# Patient Record
Sex: FEMALE | Race: ASIAN | Employment: STUDENT | URBAN - METROPOLITAN AREA
[De-identification: names, ages, dates, MRNs, and addresses within clinical notes are randomized per-mention and may not be internally consistent; named-entity substitution may affect disease eponyms.]

---

## 2023-10-24 ENCOUNTER — OFFICE VISIT (OUTPATIENT)
Dept: OBGYN CLINIC | Facility: CLINIC | Age: 23
End: 2023-10-24

## 2023-10-24 VITALS
HEIGHT: 65 IN | WEIGHT: 209.31 LBS | BODY MASS INDEX: 34.87 KG/M2 | SYSTOLIC BLOOD PRESSURE: 116 MMHG | DIASTOLIC BLOOD PRESSURE: 76 MMHG

## 2023-10-24 DIAGNOSIS — N92.6 IRREGULAR PERIODS: Primary | ICD-10-CM

## 2023-10-24 PROCEDURE — 3074F SYST BP LT 130 MM HG: CPT | Performed by: OBSTETRICS & GYNECOLOGY

## 2023-10-24 PROCEDURE — 3008F BODY MASS INDEX DOCD: CPT | Performed by: OBSTETRICS & GYNECOLOGY

## 2023-10-24 PROCEDURE — 3078F DIAST BP <80 MM HG: CPT | Performed by: OBSTETRICS & GYNECOLOGY

## 2023-10-24 PROCEDURE — 99213 OFFICE O/P EST LOW 20 MIN: CPT | Performed by: OBSTETRICS & GYNECOLOGY

## 2023-10-24 RX ORDER — ESCITALOPRAM OXALATE 10 MG/1
TABLET ORAL
COMMUNITY
Start: 2023-06-22

## 2023-10-24 RX ORDER — LEVOCETIRIZINE DIHYDROCHLORIDE 5 MG/1
5 TABLET, FILM COATED ORAL EVERY EVENING
COMMUNITY

## 2023-10-24 RX ORDER — DESOGESTREL AND ETHINYL ESTRADIOL 21-5 (28)
KIT ORAL
COMMUNITY
End: 2023-10-24

## 2023-10-24 RX ORDER — METFORMIN HYDROCHLORIDE 500 MG/1
500 TABLET, EXTENDED RELEASE ORAL
COMMUNITY
Start: 2023-04-21 | End: 2024-07-28

## 2023-10-24 RX ORDER — ORAL SEMAGLUTIDE 3 MG/1
1 TABLET ORAL DAILY
COMMUNITY
Start: 2023-04-21

## 2023-11-21 ENCOUNTER — OFFICE VISIT (OUTPATIENT)
Facility: CLINIC | Age: 23
End: 2023-11-21
Payer: COMMERCIAL

## 2023-11-21 VITALS
HEART RATE: 103 BPM | BODY MASS INDEX: 35.16 KG/M2 | HEIGHT: 65 IN | DIASTOLIC BLOOD PRESSURE: 70 MMHG | TEMPERATURE: 99 F | WEIGHT: 211 LBS | SYSTOLIC BLOOD PRESSURE: 100 MMHG | OXYGEN SATURATION: 98 %

## 2023-11-21 DIAGNOSIS — F41.9 ANXIETY: ICD-10-CM

## 2023-11-21 DIAGNOSIS — E55.9 VITAMIN D DEFICIENCY: ICD-10-CM

## 2023-11-21 DIAGNOSIS — E61.1 IRON DEFICIENCY: ICD-10-CM

## 2023-11-21 DIAGNOSIS — J45.990 MILD EXERCISE-INDUCED ASTHMA: Primary | ICD-10-CM

## 2023-11-21 DIAGNOSIS — E78.00 PURE HYPERCHOLESTEROLEMIA: ICD-10-CM

## 2023-11-21 DIAGNOSIS — Z91.018 ALLERGY TO FOOD: ICD-10-CM

## 2023-11-21 PROBLEM — T78.1XXA ALLERGIC REACTION TO FOOD: Status: ACTIVE | Noted: 2023-01-17

## 2023-11-21 PROBLEM — K21.9 GASTROESOPHAGEAL REFLUX DISEASE WITHOUT ESOPHAGITIS: Status: ACTIVE | Noted: 2022-09-09

## 2023-11-21 PROBLEM — L50.9 URTICARIA: Status: ACTIVE | Noted: 2022-08-30

## 2023-11-21 PROBLEM — L70.9 ACNE: Status: ACTIVE | Noted: 2022-06-28

## 2023-11-21 PROBLEM — E78.5 HYPERLIPIDEMIA: Status: ACTIVE | Noted: 2022-06-28

## 2023-11-21 PROBLEM — J30.2 SEASONAL ALLERGIC RHINITIS: Status: ACTIVE | Noted: 2023-01-17

## 2023-11-21 PROBLEM — E28.2 PCOS (POLYCYSTIC OVARIAN SYNDROME): Status: ACTIVE | Noted: 2022-06-28

## 2023-11-21 PROBLEM — K58.0 IRRITABLE BOWEL SYNDROME WITH DIARRHEA: Status: ACTIVE | Noted: 2022-09-30

## 2023-11-21 PROCEDURE — 3078F DIAST BP <80 MM HG: CPT | Performed by: FAMILY MEDICINE

## 2023-11-21 PROCEDURE — 3008F BODY MASS INDEX DOCD: CPT | Performed by: FAMILY MEDICINE

## 2023-11-21 PROCEDURE — 99203 OFFICE O/P NEW LOW 30 MIN: CPT | Performed by: FAMILY MEDICINE

## 2023-11-21 PROCEDURE — 3074F SYST BP LT 130 MM HG: CPT | Performed by: FAMILY MEDICINE

## 2023-11-21 RX ORDER — EPINEPHRINE 0.3 MG/.3ML
0.3 INJECTION SUBCUTANEOUS AS NEEDED
COMMUNITY
Start: 2023-01-17

## 2023-11-21 RX ORDER — FERROUS SULFATE 325(65) MG
325 TABLET ORAL DAILY
COMMUNITY

## 2023-11-21 RX ORDER — ESCITALOPRAM OXALATE 10 MG/1
10 TABLET ORAL DAILY
Qty: 90 TABLET | Refills: 3 | Status: SHIPPED | OUTPATIENT
Start: 2023-11-21

## 2023-11-21 RX ORDER — ALBUTEROL SULFATE 90 UG/1
2 AEROSOL, METERED RESPIRATORY (INHALATION) AS NEEDED
COMMUNITY

## 2023-11-21 RX ORDER — ESCITALOPRAM OXALATE 5 MG/1
5 TABLET ORAL DAILY
COMMUNITY
End: 2023-11-21

## 2023-11-21 RX ORDER — AZELASTINE 1 MG/ML
2 SPRAY, METERED NASAL AS NEEDED
COMMUNITY
Start: 2023-07-18 | End: 2024-07-17

## 2023-11-21 RX ORDER — BUDESONIDE AND FORMOTEROL FUMARATE DIHYDRATE 80; 4.5 UG/1; UG/1
2 AEROSOL RESPIRATORY (INHALATION) AS NEEDED
COMMUNITY
Start: 2023-07-18

## 2023-11-21 RX ORDER — ASCORBIC ACID 500 MG
1 TABLET ORAL EVERY OTHER DAY
COMMUNITY

## 2023-11-21 RX ORDER — ESCITALOPRAM OXALATE 5 MG/1
5 TABLET ORAL DAILY
Qty: 90 TABLET | Refills: 3 | Status: SHIPPED | OUTPATIENT
Start: 2023-11-21

## 2023-11-21 RX ORDER — ONDANSETRON 4 MG/1
4 TABLET, FILM COATED ORAL EVERY 8 HOURS PRN
COMMUNITY
Start: 2023-06-30

## 2023-11-27 NOTE — H&P
[address] [facility] - Gastroenterology                                                                                                               Reason for consult: GERD    Requesting physician or provider: No primary care provider on file. Chief Complaint   Patient presents with    Consult     Stomach issues - nausea after eating, heartburn/reflux        HPI:   Catrina Eller is a 21year old year-old female with medical history including asthma, anxiety, obesity, PCOS, CHUN. On metformin & semaglutide for PCOS & weight loss. #GERD  -symptoms are nausea, epigastric pain, heartburn, acid regurgitation x months. Reports nausea is worse with semaglutide.  -on daily omeprazole x 6 weeks with some improvement  -tested negative for H. Pylori over the summer  -on iron for CHUN - from irregular menses, now on [facility]  -CBC & CMP unremarkable July 2023    Patient denies symptoms of  vomiting, dysphagia, odynophagia, globus sensation, hematemesis, change in bowel habits, constipation, diarrhea, hematochezia, or melena. she denies recent change in appetite, fever or unintentional weight loss.       Last colonoscopy: none   Last EGD:none     NSAIDS: 400mg advil for headaches 3x weekly   Tobacco: none   Alcohol:none   Marijuana:none   Illicit drugs: none     FH GI malignancy: maternal grandfather - pancreatic CA    No history of adverse reaction to sedation  No FABRIZIO  No anticoagulants  No pacemaker/defibrillator  No pain medications and/or sleep aides    Wt Readings from Last 6 Encounters:   12/01/23 211 lb (95.7 kg)   11/21/23 211 lb (95.7 kg)   10/24/23 209 lb 4.8 oz (94.9 kg)        History, Medications, Allergies, ROS:      Past Medical History:   Diagnosis Date    Anxiety     Asthma     Environmental allergies     Obesity     PCOS (polycystic ovarian syndrome)       Past Surgical History:   Procedure Laterality Date    PATIENT DENIES ANY SURGICAL HISTORY        Family Hx:   Family History   Problem Relation Age of Onset    Thyroid disease Mother     Hypertension Mother     No Known Problems Father     No Known Problems Brother     No Known Problems Brother     Pancreatic Cancer Maternal Grandfather     Cancer Maternal Grandfather         Pancreatic cancer    Diabetes Paternal Grandfather     Substance Abuse Paternal Grandfather     Breast Cancer Neg     Ovarian Cancer Neg     Colon Cancer Neg     Uterine Cancer Neg       Social History:   Social History     Socioeconomic History    Marital status: Single   Occupational History    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[address] [employer]     Comment: plans to apply to med school   Tobacco Use    Smoking status: Never    Smokeless tobacco: Never   Vaping Use    Vaping Use: Never used   Substance and Sexual Activity    Alcohol use: Never    Drug use: Never    Sexual activity: Yes     Partners: Female     Birth control/protection: OCP   Other Topics Concern    Blood Transfusions No   Social History Narrative    Lives with partner Tories    No abuse        Medications (Active prior to today's visit):  Current Outpatient Medications   Medication Sig Dispense Refill    ondansetron 4 MG Oral Tablet Dispersible Take 1 tablet (4 mg total) by mouth every 8 (eight) hours as needed for Nausea. 30 tablet 0    albuterol 108 (90 Base) MCG/ACT Inhalation Aero Soln Inhale 2 puffs into the lungs as needed for Shortness of Breath. cholecalciferol 25 MCG (1000 UT) Oral Cap Take 1 capsule (1,000 Units total) by mouth daily. ascorbic acid 500 MG Oral Tab Take 1 tablet (500 mg total) by mouth every other day. azelastine 0.1 % Nasal Solution 2 sprays by Nasal route as needed for Rhinitis. Budesonide-Formoterol Fumarate 80-4.5 MCG/ACT Inhalation Aerosol Inhale 2 puffs into the lungs as needed. Ferrous Sulfate 325 (65 Fe) MG Oral Tab Take 1 tablet (325 mg total) by mouth daily. ondansetron (ZOFRAN) 4 mg tablet Take 1 tablet (4 mg total) by mouth every 8 (eight) hours as needed. escitalopram (LEXAPRO) 10 MG Oral Tab Take 1 tablet (10 mg total) by mouth daily. 90 tablet 3    escitalopram 5 MG Oral Tab Take 1 tablet (5 mg total) by mouth daily. 90 tablet 3    metFORMIN  MG Oral Tablet 24 Hr Take 1 tablet (500 mg total) by mouth. Semaglutide (RYBELSUS) 3 MG Oral Tab Take 1 tablet by mouth daily. levocetirizine 5 MG Oral Tab Take 1 tablet (5 mg total) by mouth every evening. norgestrel-ethinyl estradiol 0.3-30 MG-MCG Oral Tab Take 1 tablet by mouth daily. Do not take placebos 84 tablet 4    EPINEPHrine 0.3 MG/0.3ML Injection Solution Auto-injector Inject 0.3 mL (1 each total) into the muscle as needed. (Patient not taking: Reported on 12/1/2023)         Allergies: Allergies   Allergen Reactions    Shellfish Allergy HIVES, ITCHING and PAIN    Shrimp HIVES       ROS:   CONSTITUTIONAL: negative for fevers, chills, sweats  EYES Negative for scleral icterus or redness, and diplopia  HEENT: Negative for hoarseness  RESPIRATORY: Negative for cough and severe shortness of breath  CARDIOVASCULAR: Negative for crushing sub-sternal chest pain  GASTROINTESTINAL: See HPI  GENITOURINARY: Negative for dysuria  MUSCULOSKELETAL: Negative for arthralgias and myalgias  SKIN: Negative for jaundice, rash or pruritus  NEUROLOGICAL: Negative for dizziness and headaches  BEHAVIOR/PSYCH: Negative for psychotic behavior    PHYSICAL EXAM:   Blood pressure 126/83, pulse 74, height 5' 5\" (1.651 m), weight 211 lb (95.7 kg), last menstrual period 09/25/2023. GEN: Alert, no acute distress, well-nourished   HEENT: anicteric sclera, neck supple, trachea midline, MMM, no palpable or tender neck or supraclavicular lymph nodes  CV: RRR, the extremities are warm and well perfused   LUNGS: No increased work of breathing, CTAB  ABDOMEN: Soft, symmetrical, non-tender without distention or guarding.  No scars or lesions. Aorta is without bruit or visible pulsation. Umbilicus is midline without herniation. Normoactive bowel sounds are present, No masses, hepatomegaly or splenomegaly noted. MSK: No erythema, no warmth, no swelling of joints  SKIN: No jaundice, no erythema, no rashes, no lesions  HEMATOLOGIC: No bleeding, no bruising  NEURO: Alert and interactive, BRONSON  PSYCH: appropriate mood & affect    Labs/Imaging/Procedures:     Patient's pertinent labs and imaging were reviewed and discussed with patient today. .  ASSESSMENT/PLAN:   Marisol Blanco is a 21year old year-old female with medical history including asthma, anxiety, obesity, PCOS, CHUN. #GERD  -symptoms are nausea, epigastric pain, heartburn, acid regurgitation x months. Reports nausea is worse with semaglutide.  -on daily omeprazole x 6 weeks with some improvement  -tested negative for H. Pylori over the summer  -on iron for CHUN - from irregular menses, now on [facility]  -CBC & CMP unremarkable July 2023  -Differentials include but not limited to:PUD, GERD, gastritis, functional dyspepsia, gastroparesis, pancreatitis      Recommendations:  -Schedule upper endoscopy (EGD) with Dr. Codey Nelson, Dr. Yari Escobedo or Dr. Jackie Green  [Diagnosis: GERD & epigastric pain]    *HOLD metformin day before & day of procedure  *HOLD semaglutide for 1 week prior to procedure    -advised GERD lifestyle changes and to continue omeprazole 40mg daily. Can titrate off if symptoms improving or keep on daily if needed. Advised to try and decrease advil use and try tylenol instead. ENDOSCOPIC RISK BENEFIT DISCUSSION: I described the procedure in great detail with the patient. I discussed the risks and benefits, including but not limited to: bleeding, perforation, infection, anesthesia complications, and even death. Patient will be NPO after midnight and will have a person physically present at time of pick-up to drive patient home.  Patient verbalized understanding and agrees to proceed with procedure as planned. Orders This Visit:  No orders of the defined types were placed in this encounter. Meds This Visit:  Requested Prescriptions     Signed Prescriptions Disp Refills    ondansetron 4 MG Oral Tablet Dispersible 30 tablet 0     Sig: Take 1 tablet (4 mg total) by mouth every 8 (eight) hours as needed for Nausea. Imaging & Referrals:  None      Lizandro Alarcon [facility]  11/27/2023        This note was partially prepared using [employer] [facility] [employer] voice recognition dictation software. As a result, errors may occur. When identified, these errors have been corrected.  While every attempt is made to correct errors during dictation, discrepancies may still exist. 124

## 2023-12-01 ENCOUNTER — TELEPHONE (OUTPATIENT)
Facility: CLINIC | Age: 23
End: 2023-12-01

## 2023-12-01 ENCOUNTER — OFFICE VISIT (OUTPATIENT)
Facility: CLINIC | Age: 23
End: 2023-12-01
Payer: COMMERCIAL

## 2023-12-01 VITALS
DIASTOLIC BLOOD PRESSURE: 83 MMHG | BODY MASS INDEX: 35.16 KG/M2 | HEART RATE: 74 BPM | WEIGHT: 211 LBS | SYSTOLIC BLOOD PRESSURE: 126 MMHG | HEIGHT: 65 IN

## 2023-12-01 DIAGNOSIS — R10.13 EPIGASTRIC PAIN: Primary | ICD-10-CM

## 2023-12-01 DIAGNOSIS — R10.13 EPIGASTRIC PAIN: ICD-10-CM

## 2023-12-01 DIAGNOSIS — K21.9 GASTROESOPHAGEAL REFLUX DISEASE, UNSPECIFIED WHETHER ESOPHAGITIS PRESENT: ICD-10-CM

## 2023-12-01 DIAGNOSIS — R11.0 NAUSEA: ICD-10-CM

## 2023-12-01 DIAGNOSIS — K21.9 GASTROESOPHAGEAL REFLUX DISEASE, UNSPECIFIED WHETHER ESOPHAGITIS PRESENT: Primary | ICD-10-CM

## 2023-12-01 PROCEDURE — 99204 OFFICE O/P NEW MOD 45 MIN: CPT

## 2023-12-01 PROCEDURE — 3079F DIAST BP 80-89 MM HG: CPT

## 2023-12-01 PROCEDURE — 3008F BODY MASS INDEX DOCD: CPT

## 2023-12-01 PROCEDURE — 3074F SYST BP LT 130 MM HG: CPT

## 2023-12-01 RX ORDER — ONDANSETRON 4 MG/1
4 TABLET, ORALLY DISINTEGRATING ORAL EVERY 8 HOURS PRN
Qty: 30 TABLET | Refills: 0 | Status: SHIPPED | OUTPATIENT
Start: 2023-12-01

## 2023-12-01 NOTE — PATIENT INSTRUCTIONS
Antacids  -Tums, Rolaids, Camille-seltzer heartburn (calcium carbonate)  -pepto-bismol (Bismuth subsalicylate, aluminium magnesium)  -mylanta, maalox, gaviscon, gelusil (Aluminum Hydroxide, Magnesium Hydroxide)  -milk of magnesia (magnesium hydroxide)    H2 receptor antagonists  -famotidine (Pepcid) 20-40mg daily (or 20mg BID)    Proton-pump inhibitors (PPIs)  -omeprazole (prilosec) 20mg-40mg daily  -pantoprazole (protonix) 40mg daily  -lansoprazole (prevacid) 15-30mg daily  -Esomeprazole (Nexium) 20-40mg daily  -Rabeprazole (AcipHex) 20mg daily      1. Schedule upper endoscopy (EGD) with Dr. Jen Diamond, Dr. Dai Rose or Dr. Karen Brock: GERD & epigastric pain]    *HOLD metformin day before & day of procedure  *HOLD semaglutide for 1 week prior to procedure    2. If you start any NEW medication after your visit today, please notify us. Certain medications will need to be held before the procedure, or the procedure cannot be performed safely. 3. DO NOT TAKE: Iron (ferrous sulfate/ ferrous gluconate) pills, herbal supplements, multivitamins, or diet medications (i.e. Phentermine/Vyvanse) for 7 days before exam.  DO NOT TAKE: Any form of alcohol, recreational drugs and any forms of Erectile Dysfunction medications 24 hours prior to procedure. 4. The day BEFORE your procedure, NOTHING TO EAT OR DRINK AFTER MIDNIGHT! If your procedure is scheduled in the afternoon, you may have clear liquids only (see below) up to 3 hours before the time of your procedure. If you fail to keep your stomach empty for 3 hours prior to procedure time, your procedure may be CANCELLED.  Instructions can also be found at: www.[facility].org/giprep

## 2023-12-01 NOTE — TELEPHONE ENCOUNTER
Scheduled for:  EGD 01980  Provider Name:  Dr. Julio Cesar Prabhakar  Date:  2/1/2024  Location:  [facility]  Sedation:  MAC  Time:  1:30 pm, arrival 12:30 pm  Prep:  NPO after midnight  Meds/Allergies Reconciled?:  Mackenzie Phelan reviewed  Diagnosis with codes:  GERD K21.9; Epigastric pain R10.13  Was patient informed to call insurance with codes (Y/N):  Yes  Referral sent?:  Referral was sent at the time of electronic surgical scheduling. [facility]  notified?:  I sent an electronic request to Endo Scheduling and received a confirmation today. Medication Orders:  *HOLD metformin day before & day of procedure  *HOLD semaglutide for 1 week prior to procedure  Pt is aware to NOT take iron pills, herbal meds and diet supplements for 7 days before exam. Also to NOT take any form of alcohol, recreational drugs and any forms of ED meds 24 hours before exam.   Misc Orders:  N/A     Further instructions given by staff:  Prep instructions were given to pt in the office, pt verbalized understanding.

## 2023-12-08 ENCOUNTER — PATIENT MESSAGE (OUTPATIENT)
Facility: CLINIC | Age: 23
End: 2023-12-08

## 2023-12-09 RX ORDER — ORAL SEMAGLUTIDE 14 MG/1
1 TABLET ORAL DAILY
Qty: 90 TABLET | Refills: 3 | Status: SHIPPED | OUTPATIENT
Start: 2023-12-09

## 2023-12-09 RX ORDER — ORAL SEMAGLUTIDE 14 MG/1
TABLET ORAL
COMMUNITY
Start: 2023-06-28 | End: 2023-12-09

## 2023-12-26 ENCOUNTER — PATIENT MESSAGE (OUTPATIENT)
Dept: OBGYN CLINIC | Facility: CLINIC | Age: 23
End: 2023-12-26

## 2023-12-27 DIAGNOSIS — N92.6 IRREGULAR PERIODS: ICD-10-CM

## 2023-12-27 NOTE — TELEPHONE ENCOUNTER
From: Ashanti Judge  To: Monica Mackenzie  Sent: 12/26/2023 9:15 PM CST  Subject: Birth control refill - [place]    Hi,    I have a family emergency and I have to head out of town tomorrow back to [place] where my family lives. I will run out of birth control pills on Saturday.  Can I get my script refilled at a [facility] in [place]?

## 2024-01-17 ENCOUNTER — TELEPHONE (OUTPATIENT)
Facility: CLINIC | Age: 24
End: 2024-01-17

## 2024-01-17 DIAGNOSIS — R10.13 EPIGASTRIC PAIN: ICD-10-CM

## 2024-01-17 DIAGNOSIS — K21.9 GASTROESOPHAGEAL REFLUX DISEASE, UNSPECIFIED WHETHER ESOPHAGITIS PRESENT: Primary | ICD-10-CM

## 2024-01-17 NOTE — TELEPHONE ENCOUNTER
CANCELLED for:  EGD 58215  Provider Name:  Dr. Diaz  Date:  2/1/2024  Location:  [facility]  Sedation:  MAC  Time:  1:30 pm,            Cancelled in epic and endo

## 2024-02-14 ENCOUNTER — OFFICE VISIT (OUTPATIENT)
Dept: OBGYN CLINIC | Facility: CLINIC | Age: 24
End: 2024-02-14
Payer: COMMERCIAL

## 2024-02-14 VITALS
BODY MASS INDEX: 36.49 KG/M2 | SYSTOLIC BLOOD PRESSURE: 110 MMHG | HEIGHT: 65 IN | DIASTOLIC BLOOD PRESSURE: 72 MMHG | WEIGHT: 219 LBS

## 2024-02-14 DIAGNOSIS — N92.1 METRORRHAGIA: Primary | ICD-10-CM

## 2024-02-14 PROCEDURE — 81514 NFCT DS BV&VAGINITIS DNA ALG: CPT | Performed by: OBSTETRICS & GYNECOLOGY

## 2024-02-14 PROCEDURE — 3074F SYST BP LT 130 MM HG: CPT | Performed by: OBSTETRICS & GYNECOLOGY

## 2024-02-14 PROCEDURE — 3008F BODY MASS INDEX DOCD: CPT | Performed by: OBSTETRICS & GYNECOLOGY

## 2024-02-14 PROCEDURE — 87591 N.GONORRHOEAE DNA AMP PROB: CPT | Performed by: OBSTETRICS & GYNECOLOGY

## 2024-02-14 PROCEDURE — 87491 CHLMYD TRACH DNA AMP PROBE: CPT | Performed by: OBSTETRICS & GYNECOLOGY

## 2024-02-14 PROCEDURE — 3078F DIAST BP <80 MM HG: CPT | Performed by: OBSTETRICS & GYNECOLOGY

## 2024-02-14 PROCEDURE — 99213 OFFICE O/P EST LOW 20 MIN: CPT | Performed by: OBSTETRICS & GYNECOLOGY

## 2024-02-14 RX ORDER — AZITHROMYCIN 500 MG/1
TABLET, FILM COATED ORAL
Qty: 4 TABLET | Refills: 0 | Status: SHIPPED | OUTPATIENT
Start: 2024-02-14 | End: 2024-02-18

## 2024-02-14 NOTE — PROGRESS NOTES
CC: Patient is here for vaginal bleeding after sex.     HPI: Patient is a 23 year old  for bleeding with sex or dildo x 1 M. No previous hx of similar c/o.     She switched to loovral 10/2023 with no futher BTB. Using pills continuously with no periods since.       Patient's last menstrual period was 2023.    OB History    Para Term  AB Living   0 0 0 0 0 0   SAB IAB Ectopic Multiple Live Births   0 0 0 0 0       GYN hx:       LPS:    Past Medical History:   Diagnosis Date    Anxiety     Asthma     Environmental allergies     Obesity     PCOS (polycystic ovarian syndrome)      Past Surgical History:   Procedure Laterality Date    PATIENT DENIES ANY SURGICAL HISTORY       Allergies   Allergen Reactions    Shellfish Allergy HIVES, ITCHING and PAIN    Shrimp HIVES     Family History   Problem Relation Age of Onset    Thyroid disease Mother     Hypertension Mother     No Known Problems Father     No Known Problems Brother     No Known Problems Brother     Pancreatic Cancer Maternal Grandfather     Cancer Maternal Grandfather         Pancreatic cancer    Diabetes Paternal Grandfather     Substance Abuse Paternal Grandfather     Breast Cancer Neg     Ovarian Cancer Neg     Colon Cancer Neg     Uterine Cancer Neg      Social History     Socioeconomic History    Marital status: Single     Spouse name: Not on file    Number of children: Not on file    Years of education: Not on file    Highest education level: Not on file   Occupational History    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     Comment: plans to apply to med school   Tobacco Use    Smoking status: Never    Smokeless tobacco: Never   Vaping Use    Vaping Use: Never used   Substance and Sexual Activity    Alcohol use: Never    Drug use: Never    Sexual activity: Yes     Partners: Female     Birth control/protection: OCP   Other Topics Concern     Service Not Asked    Blood Transfusions No    Caffeine Concern Not  Asked    Occupational Exposure Not Asked    Hobby Hazards Not Asked    Sleep Concern Not Asked    Stress Concern Not Asked    Weight Concern Not Asked    Special Diet Not Asked    Back Care Not Asked    Exercise Not Asked    Bike Helmet Not Asked    Seat Belt Not Asked    Self-Exams Not Asked   Social History Narrative    Lives with partner Tories    No abuse     Social Determinants of Health     Financial Resource Strain: Not on file   Food Insecurity: Not on file   Transportation Needs: Not on file   Physical Activity: Not on file   Stress: Not on file   Social Connections: Not on file   Housing Stability: Not on file       Medications reviewed. See active list.     /72   Ht 65\"   Wt 219 lb (99.3 kg)   LMP 09/25/2023   BMI 36.44 kg/m²       Exam:   GENERAL: well developed, well nourished, in no apparent distress  ABDOMEN: Soft, non distended; non tender, no masses.  Liver and spleen non-tender, no enlargement. No palpable hernias  GYNE/:  External Genitalia: Normal appearing, no lesions, normal hair distribution   Urethral meatus appear wnl, no abnormal discharge or lesions noted.   Bladder: well supported, urethra wnl, no palpable tenderness or masses, no discharge  Vagina: normal pink mucosa, no lesions, normal clear discharge.   Uterus: midline, mobile, non-tender, firm and smooth  Cervix: pink, no lesions grossly visible, no discharge, + friable cervix.   Adnexa: non tender, no palpable masses, normal size  Anus:  No lesions or visible hemorrhoids        A/P: Patient is 23 year old female     1. Metrorrhagia    May be due to cervicitis. 2 gm azithromax given.       Sierra Mackenzie MD

## 2024-02-15 ENCOUNTER — PATIENT MESSAGE (OUTPATIENT)
Dept: OBGYN CLINIC | Facility: CLINIC | Age: 24
End: 2024-02-15

## 2024-02-15 LAB
BV BACTERIA DNA VAG QL NAA+PROBE: NEGATIVE
C GLABRATA DNA VAG QL NAA+PROBE: NEGATIVE
C KRUSEI DNA VAG QL NAA+PROBE: NEGATIVE
C TRACH DNA SPEC QL NAA+PROBE: NEGATIVE
CANDIDA DNA VAG QL NAA+PROBE: NEGATIVE
N GONORRHOEA DNA SPEC QL NAA+PROBE: NEGATIVE
T VAGINALIS DNA VAG QL NAA+PROBE: NEGATIVE

## 2024-02-15 NOTE — TELEPHONE ENCOUNTER
From: Michael Hoffman  To: Sierra Mackenzie  Sent: 2/15/2024 4:18 PM CST  Subject: Antibiotic     Since my tests were negative, do I still need to take the antibiotic?

## 2024-02-19 ENCOUNTER — ULTRASOUND ENCOUNTER (OUTPATIENT)
Dept: OBGYN CLINIC | Facility: CLINIC | Age: 24
End: 2024-02-19
Payer: COMMERCIAL

## 2024-02-19 DIAGNOSIS — N92.1 METRORRHAGIA: ICD-10-CM

## 2024-02-29 ENCOUNTER — OFFICE VISIT (OUTPATIENT)
Dept: OBGYN CLINIC | Facility: CLINIC | Age: 24
End: 2024-02-29

## 2024-02-29 ENCOUNTER — PATIENT MESSAGE (OUTPATIENT)
Dept: OBGYN CLINIC | Facility: CLINIC | Age: 24
End: 2024-02-29

## 2024-02-29 VITALS
DIASTOLIC BLOOD PRESSURE: 83 MMHG | WEIGHT: 216 LBS | HEART RATE: 79 BPM | HEIGHT: 65 IN | TEMPERATURE: 99 F | BODY MASS INDEX: 35.99 KG/M2 | SYSTOLIC BLOOD PRESSURE: 117 MMHG

## 2024-02-29 DIAGNOSIS — N93.9 ABNORMAL UTERINE BLEEDING (AUB): ICD-10-CM

## 2024-02-29 DIAGNOSIS — N94.10 DYSPAREUNIA, FEMALE: Primary | ICD-10-CM

## 2024-02-29 PROCEDURE — 3074F SYST BP LT 130 MM HG: CPT | Performed by: ADVANCED PRACTICE MIDWIFE

## 2024-02-29 PROCEDURE — 3008F BODY MASS INDEX DOCD: CPT | Performed by: ADVANCED PRACTICE MIDWIFE

## 2024-02-29 PROCEDURE — 99212 OFFICE O/P EST SF 10 MIN: CPT | Performed by: ADVANCED PRACTICE MIDWIFE

## 2024-02-29 PROCEDURE — 3079F DIAST BP 80-89 MM HG: CPT | Performed by: ADVANCED PRACTICE MIDWIFE

## 2024-02-29 NOTE — PROGRESS NOTES
Chief Complaint   Patient presents with    Gyn Exam     Patient is experiencing vaginal bleeding with penetration and pain and this morning she noticed more blood was coming out,  taking bc so she's not really supposed to be getting a period       HPI:   Michael is 23 year old , here today with her female partner to follow up on abnormal vaginal bleeding. Reports for the past 3 weeks she has had pain with arousal and vaginal bleeding with penetration. Reports the bleeding is dark red, gushes out for a very short time and then completely stops with no further bleeding or spotting after. It seems to be worsening because last night at 4am she had a random gush of the dark red blood totally spontaneously. Last nights gush of blood was painless.   In the past she has had breakthrough bleeding on birth control but reports this is very different as that was brown/spotting and would happen on and off not during penetration.   She has not yet started the antibiotics that were prescribed to her last week because all of her cultures were negative so she's confused on how that may help.  Pap done last week NILM  Long term female partner, monogamous    Patient Active Problem List   Diagnosis    Irregular periods    Acne    Allergy to food    Anxiety    Gastroesophageal reflux disease without esophagitis    Hyperlipidemia    Iron deficiency    Irritable bowel syndrome with diarrhea    Mild exercise-induced asthma (HCC)    PCOS (polycystic ovarian syndrome)    Seasonal allergic rhinitis    Urticaria    Vitamin D deficiency        Medications (Active prior to today's visit):  Current Outpatient Medications   Medication Sig Dispense Refill    Semaglutide (RYBELSUS) 14 MG Oral Tab Take 1 tablet by mouth daily. 90 tablet 3    ondansetron 4 MG Oral Tablet Dispersible Take 1 tablet (4 mg total) by mouth every 8 (eight) hours as needed for Nausea. 30 tablet 0    albuterol 108 (90 Base) MCG/ACT Inhalation Aero Soln Inhale 2 puffs  into the lungs as needed for Shortness of Breath.      cholecalciferol 25 MCG (1000 UT) Oral Cap Take 1 capsule (1,000 Units total) by mouth daily.      ascorbic acid 500 MG Oral Tab Take 1 tablet (500 mg total) by mouth every other day.      azelastine 0.1 % Nasal Solution 2 sprays by Nasal route as needed for Rhinitis.      Budesonide-Formoterol Fumarate 80-4.5 MCG/ACT Inhalation Aerosol Inhale 2 puffs into the lungs as needed.      Ferrous Sulfate 325 (65 Fe) MG Oral Tab Take 1 tablet (325 mg total) by mouth daily.      escitalopram (LEXAPRO) 10 MG Oral Tab Take 1 tablet (10 mg total) by mouth daily. 90 tablet 3    escitalopram 5 MG Oral Tab Take 1 tablet (5 mg total) by mouth daily. 90 tablet 3    metFORMIN  MG Oral Tablet 24 Hr Take 1 tablet (500 mg total) by mouth.      levocetirizine 5 MG Oral Tab Take 1 tablet (5 mg total) by mouth every evening.      norgestrel-ethinyl estradiol 0.3-30 MG-MCG Oral Tab Take 1 tablet by mouth daily. Do not take placebos 84 tablet 4    EPINEPHrine 0.3 MG/0.3ML Injection Solution Auto-injector Inject 0.3 mL (1 each total) into the muscle as needed. (Patient not taking: Reported on 12/1/2023)         Allergies:  Allergies   Allergen Reactions    Shellfish Allergy HIVES, ITCHING and PAIN    Shrimp HIVES       ROS:  Review of Systems   Constitutional: Negative.    Respiratory: Negative.     Cardiovascular: Negative.    Gastrointestinal: Negative.    Genitourinary:  Positive for dyspareunia and vaginal bleeding. Negative for difficulty urinating, dysuria, frequency, genital sores, hematuria, menstrual problem, pelvic pain, urgency, vaginal discharge and vaginal pain.   Neurological: Negative.    Psychiatric/Behavioral: Negative.         PHYSICAL EXAM:  Vitals:    02/29/24 0729   BP: 117/83   Pulse: 79   Temp: 98.9 °F (37.2 °C)     Physical Exam  Vitals and nursing note reviewed.   Constitutional:       General: She is not in acute distress.     Appearance: Normal appearance.  She is not ill-appearing.   HENT:      Head: Normocephalic and atraumatic.   Cardiovascular:      Rate and Rhythm: Normal rate.   Pulmonary:      Effort: Pulmonary effort is normal. No respiratory distress.   Neurological:      Mental Status: She is alert and oriented to person, place, and time.   Psychiatric:         Mood and Affect: Mood normal.         Behavior: Behavior normal.         Thought Content: Thought content normal.         Judgment: Judgment normal.           ASSESSMENT/PLAN:     Michael was seen today for gyn exam.    Diagnoses and all orders for this visit:    Dyspareunia, female  -     Pelvic Floor Therapy - [place] [facility]    Abnormal uterine bleeding (AUB)    -- Discussed that a chronic cervicitis can have all negative cultures and could still benefit from treatment with antibiotics. Recommended pt try the antibiotics. Discussed that its possible for the bleeding and pain with arousal to be not related. The pain with arousal/penetration could be pelvic floor dysfunction and the bleeding could be uterine polyps which often do not show up on a regular pelvic ultrasound. Painless dark red bleeding is consistent with polyps. Recommended SIS vs. Femvue as next step if bleeding does not improve after the antibiotics. Pt in agreement with this plan. Would like to make appointment with Dr. Pederson for the imaging.      Follow-up/Return to clinic: in 2-3 weeks with Dr. Pederson for SIS or femvue if bleeding continues     Counseling:   Best hygiene practices  Frequency of health screening and personal risks  Pap, SBE/CBE, mammography      20 minutes face to face counseling, chart review, orders and coordination of care    Patient verbalized understanding, All questions answered. No barriers to learning identified

## 2024-03-03 NOTE — TELEPHONE ENCOUNTER
Geno Scott, RN 2/29/2024 9:19 AM CST    Pt was just seen today, pls advise regarding timeframe of scheduling.   ----- Message -----  From: Michael Hoffman  Sent: 2/29/2024 8:35 AM CST  To: [facility] Clinical Pool  Subject: Imaging     Dr. Dacia Mead's soonest appointment is Apr 11. Would it be possible for me to get scheduled for imaging prior to this? Without going through an OB? It's just a long time to wait.

## 2024-03-05 DIAGNOSIS — N93.9 ABNORMAL UTERINE BLEEDING: Primary | ICD-10-CM

## 2024-03-11 ENCOUNTER — TELEPHONE (OUTPATIENT)
Dept: OBGYN CLINIC | Facility: CLINIC | Age: 24
End: 2024-03-11

## 2024-03-21 ENCOUNTER — OFFICE VISIT (OUTPATIENT)
Dept: OBGYN CLINIC | Facility: CLINIC | Age: 24
End: 2024-03-21

## 2024-03-21 VITALS
WEIGHT: 213 LBS | BODY MASS INDEX: 35.49 KG/M2 | HEIGHT: 65 IN | SYSTOLIC BLOOD PRESSURE: 116 MMHG | DIASTOLIC BLOOD PRESSURE: 82 MMHG | HEART RATE: 82 BPM

## 2024-03-21 DIAGNOSIS — N93.9 ABNORMAL UTERINE BLEEDING (AUB): Primary | ICD-10-CM

## 2024-03-21 PROCEDURE — 99212 OFFICE O/P EST SF 10 MIN: CPT | Performed by: ADVANCED PRACTICE MIDWIFE

## 2024-03-21 PROCEDURE — 3079F DIAST BP 80-89 MM HG: CPT | Performed by: ADVANCED PRACTICE MIDWIFE

## 2024-03-21 PROCEDURE — 3008F BODY MASS INDEX DOCD: CPT | Performed by: ADVANCED PRACTICE MIDWIFE

## 2024-03-21 PROCEDURE — 3074F SYST BP LT 130 MM HG: CPT | Performed by: ADVANCED PRACTICE MIDWIFE

## 2024-03-21 NOTE — PROGRESS NOTES
Chief Complaint   Patient presents with    Gyn Exam     Still having bleeding since last visit for BC last for 1 day, irregular bleeding, cramping      HPI:   Michael is 23 year old , here today for follow up on abnormal bleeding. The random gushes of bleeding have started to increase in frequency. It has happened 3x this week so far. When it happens she gets no warning and just suddenly starts gushing bright red blood. It soaks through her clothes. It doesn't continue for very long and then abruptly stops with no further bleeding until the next episode. She doesn't have any pain before but always has some uterine cramping for a few hours after the episodes. The bleeding episodes are interfering with her life. She was at the gym and bled through her clothes and had to leave.   SIS is scheduled for   Takes her COCPs continuously since October. Wondering if she should try to get a withdrawal bleed to see if that will help.  Has not yet tried the antibiotics prescribed by Dr. Humphreys.  Last pap 2024 NILM  Here with her monogamous female partner. STI testing UTD.        Patient Active Problem List   Diagnosis    Irregular periods    Acne    Allergy to food    Anxiety    Gastroesophageal reflux disease without esophagitis    Hyperlipidemia    Iron deficiency    Irritable bowel syndrome with diarrhea    Mild exercise-induced asthma (HCC)    PCOS (polycystic ovarian syndrome)    Seasonal allergic rhinitis    Urticaria    Vitamin D deficiency        Note: This is a gyn only visit. Pt has PCP and is referred back to PCP for care of any non-gyn concerns listed above in the Problem List.    Medications (Active prior to today's visit):  Current Outpatient Medications   Medication Sig Dispense Refill    Semaglutide (RYBELSUS) 14 MG Oral Tab Take 1 tablet by mouth daily. 90 tablet 3    ondansetron 4 MG Oral Tablet Dispersible Take 1 tablet (4 mg total) by mouth every 8 (eight) hours as needed for Nausea. 30 tablet  0    albuterol 108 (90 Base) MCG/ACT Inhalation Aero Soln Inhale 2 puffs into the lungs as needed for Shortness of Breath.      cholecalciferol 25 MCG (1000 UT) Oral Cap Take 1 capsule (1,000 Units total) by mouth daily.      ascorbic acid 500 MG Oral Tab Take 1 tablet (500 mg total) by mouth every other day.      azelastine 0.1 % Nasal Solution 2 sprays by Nasal route as needed for Rhinitis.      Budesonide-Formoterol Fumarate 80-4.5 MCG/ACT Inhalation Aerosol Inhale 2 puffs into the lungs as needed.      Ferrous Sulfate 325 (65 Fe) MG Oral Tab Take 1 tablet (325 mg total) by mouth daily.      escitalopram (LEXAPRO) 10 MG Oral Tab Take 1 tablet (10 mg total) by mouth daily. 90 tablet 3    escitalopram 5 MG Oral Tab Take 1 tablet (5 mg total) by mouth daily. 90 tablet 3    metFORMIN  MG Oral Tablet 24 Hr Take 1 tablet (500 mg total) by mouth.      levocetirizine 5 MG Oral Tab Take 1 tablet (5 mg total) by mouth every evening.      norgestrel-ethinyl estradiol 0.3-30 MG-MCG Oral Tab Take 1 tablet by mouth daily. Do not take placebos 84 tablet 4    EPINEPHrine 0.3 MG/0.3ML Injection Solution Auto-injector Inject 0.3 mL (1 each total) into the muscle as needed. (Patient not taking: Reported on 12/1/2023)         Allergies:  Allergies   Allergen Reactions    Shellfish Allergy HIVES, ITCHING and PAIN    Shrimp HIVES       ROS:  Review of Systems   Constitutional: Negative.    Respiratory: Negative.     Cardiovascular: Negative.    Gastrointestinal: Negative.    Genitourinary:  Positive for menstrual problem, pelvic pain and vaginal bleeding. Negative for difficulty urinating, dyspareunia, dysuria, frequency, genital sores, hematuria, urgency, vaginal discharge and vaginal pain.   Neurological: Negative.    Psychiatric/Behavioral: Negative.         PHYSICAL EXAM:  Vitals:    03/21/24 0741   BP: 116/82   Pulse: 82     Physical Exam  Vitals reviewed.   Constitutional:       General: She is not in acute distress.      Appearance: Normal appearance. She is not ill-appearing.   HENT:      Head: Normocephalic and atraumatic.   Cardiovascular:      Rate and Rhythm: Normal rate.   Pulmonary:      Effort: Pulmonary effort is normal. No respiratory distress.   Neurological:      Mental Status: She is alert and oriented to person, place, and time.   Psychiatric:         Mood and Affect: Mood normal.         Behavior: Behavior normal.         Thought Content: Thought content normal.         Judgment: Judgment normal.        No results found for this or any previous visit (from the past 24 hour(s)).      ASSESSMENT/PLAN:     Michael was seen today for gyn exam.    Diagnoses and all orders for this visit:    Abnormal uterine bleeding (AUB)    -- Recommended pt start the antibiotics tonight and complete the entire course.   -- Recommended pt stop her COCPs for 7 days to get a withdrawal bleed and then restart.   -- Pt to get something scheduled with one of the OBGYN Mds for after her SIS so that she can discuss next steps.  -- Discussed possibility of EMB depending on results of SIS  -- Discussed mIUD as a treatment option for AUB after her SIS. Pt will consider.      Follow-up/Return to clinic: after SIS    Counseling:   Contraceptive method(s), STI and HIV risk reduction/condom use  Frequency of health screening and personal risks      I have spent 20 minutes total time on the day of the encounter, including: preparing to see the patient, ordering/reviewing labs, performing a medically appropriate exam, and providing care coordination. face to face counseling, chart review, orders and coordination of care    Patient verbalized understanding, All questions answered. No barriers to learning identified

## 2024-04-16 ENCOUNTER — TELEPHONE (OUTPATIENT)
Dept: OBGYN CLINIC | Facility: CLINIC | Age: 24
End: 2024-04-16

## 2024-04-16 ENCOUNTER — OFFICE VISIT (OUTPATIENT)
Dept: OBGYN CLINIC | Facility: CLINIC | Age: 24
End: 2024-04-16
Payer: COMMERCIAL

## 2024-04-16 VITALS — BODY MASS INDEX: 35 KG/M2 | HEIGHT: 65 IN

## 2024-04-16 DIAGNOSIS — N93.9 ABNORMAL UTERINE BLEEDING: ICD-10-CM

## 2024-04-16 PROCEDURE — 58340 CATHETER FOR HYSTEROGRAPHY: CPT | Performed by: OBSTETRICS & GYNECOLOGY

## 2024-04-16 PROCEDURE — 76831 ECHO EXAM UTERUS: CPT | Performed by: OBSTETRICS & GYNECOLOGY

## 2024-04-16 NOTE — PROGRESS NOTES
Patient has been having episodes of gushing blood on OCP's for the past 3 - 4 M. Concerned that she has a polyp.     Procedure explained to patient and consent obtained. Speculum placed in vagina. Paitent initially nauseous and vomiting with speculum placement, but able to tolerate. HSG balloon catheter placed through cervical os and balloon inflated with 2 ml sterile saline. Speculum removed. Transvaginal U/S performed as assistant injected saline through the catheter into the uterus. Findings: 7 mm polyp in MAYTE  Following the procedure the balloon was deflated and catheter removed. Results discussed with patient.    Plan 5/14/2024 hysteroscopy with endometrial polypectomy. I discussed with the patient  the procedure including the preop/procedure/postop course and the risks of  bleeding, infection, damage to internal organs.  All questions were answered, and written information was given to the pt regarding the procedure.    DW pt option of placing Mirena IUD while under general anesthesia and she is considering

## 2024-04-16 NOTE — TELEPHONE ENCOUNTER
RN spoke to pt and confirmed that she is having a saline US today. She sked the appt for 4/18 so that she can discuss the saline US results with LC. RN told pt that LC will talk her through the procedure, and if she feels like her questions were answered today, she can cancel the appt for 4/18. Pt verbalized understanding and agreed with POC.

## 2024-04-17 ENCOUNTER — TELEPHONE (OUTPATIENT)
Dept: OBGYN CLINIC | Facility: CLINIC | Age: 24
End: 2024-04-17

## 2024-04-17 DIAGNOSIS — N84.0 UTERINE POLYP: Primary | ICD-10-CM

## 2024-04-17 NOTE — TELEPHONE ENCOUNTER
----- Message from Sierra Mackenzie MD sent at 2024  5:57 PM CDT -----  Regardin/14/ surgery    Please schedule the following surgery:    Procedure: hysteroscopy with endometrial polypectomy  Assist: (Y/N or none) none  Date:            2024                          Time Requested:   Dx: uterine polyp  Pre-op appt: (Y/N or n/a)  Admission type: (IN/OUT/OBVS) outpt  Department of discharge(SDS/Floor): SDS  Expected length of stay:   Procedure length time (please enter amount you are requesting): 1 hour  Recovery time (patients always ask):  Medical Clearance: (Y/N)  Post- Op f/u appt time frame:     Pre-op orders (choose one)   X Use [facility] procedure driven order set in addition to anesthesia protocol   Use [facility] surgeon's personalized order set   Surgeon to enter pre-op orders   No pre-op orders   Use the prophylactic antibiotic protocol   No pre-op antibiotic orders indicated do not give antibiotic, if any, listed on the procedure driven order sets or personalized order sets    PCN allergy Yes___ or No___   If Yes: Proceed with PCN/Cephalosporin recommended antibiotic as benefit outweighs risk     Proceed with PCN/Cephalosporin recommended antibiotic as not a true allergy    Proceed with recommended alternative antibiotic for PCN allergy        ALL Medicaid/including BCBS community: Tubal/ Hyst form MUST be signed (30 days):      Message to nurses:

## 2024-04-22 ENCOUNTER — PATIENT MESSAGE (OUTPATIENT)
Facility: CLINIC | Age: 24
End: 2024-04-22

## 2024-04-23 ENCOUNTER — NURSE TRIAGE (OUTPATIENT)
Dept: FAMILY MEDICINE CLINIC | Facility: CLINIC | Age: 24
End: 2024-04-23

## 2024-04-23 NOTE — TELEPHONE ENCOUNTER
Liliane Gee, RN 4/23/2024 12:12 PM CDT        ----- Message -----  From: Michael Hoffman  Sent: 4/22/2024 10:09 PM CDT  To: Em Triage Support  Subject: Ingrown hair     Good evening,    I have a hard lump on my leg, which I’m very sure is an ingrown. I wanted to know if this is something that can be dealt with in the PCP office or if I need to contact dermatology. If I do need to contact them, what number do I call?

## 2024-04-23 NOTE — TELEPHONE ENCOUNTER
Need to see patient in office to further evaluate. If this folliculitis sometimes may resolve without antibiotics. In office exam will ensure this is not something more like an abscess or cellulitis. Please offer appointment 4/25/24 at 10:30 a.m., 11:30 a.m. , 1:00 p.m or 6:00 p.m. In the meantime have patient do warm compresses for 15 minutes 3 times a day. If worsens before able to be seen to see IC or ED.    Shilpa Corona MD, 04/23/24, 4:52 PM

## 2024-04-23 NOTE — TELEPHONE ENCOUNTER
Action Requested: Summary for Provider     []  Critical Lab, Recommendations Needed  [x] Need Additional Advice  []   FYI    []   Need Orders  [x] Need Medications Sent to Pharmacy  []  Other     SUMMARY: Pt wants MD advise.    Pt picked at hair follicle near upper inner thigh and got infected. Feels it is an infected ingrown hair follicle. Larger in size and hard to touch, warm to touch. No drainage. No home remedies done. Pain scale 3 and bothersome. See care advise.    Offered advise, declined. Pt wants MD advise on treatment or should see dermatology?    Please reply to pool: EM RN TRIAGE      Reason for call: Skin Irritation  Onset: Data Unavailable      General Assessment (questions to ask the caller)  Do you consider this a medical emergency?: No  Can you access 911?: Yes  Do you have any pain?: Yes  What is the severity of your pain? (Scale 1-10): 3  Do you have a fever?: No                Reason for Disposition   Mild localized rash    Protocols used: Rash or Redness - [id number]

## 2024-05-08 ENCOUNTER — TELEPHONE (OUTPATIENT)
Dept: OBGYN CLINIC | Facility: CLINIC | Age: 24
End: 2024-05-08

## 2024-05-08 DIAGNOSIS — N84.0 ENDOMETRIAL POLYP: Primary | ICD-10-CM

## 2024-05-08 DIAGNOSIS — N84.0 UTERINE POLYP: ICD-10-CM

## 2024-05-08 NOTE — TELEPHONE ENCOUNTER
Patient is calling requesting to reschedule surgery that is schedule for 5/14/2024, patient is requesting for after 6/15/2024. Please assist.

## 2024-05-09 NOTE — TELEPHONE ENCOUNTER
Patient rescheduled to 6/17/24.  Notified patient via [employer] message.     Please see telephone encounter 4/17/24 (procedure 6/17/24).

## 2024-05-20 ENCOUNTER — LAB ENCOUNTER (OUTPATIENT)
Dept: LAB | Facility: REFERENCE LAB | Age: 24
End: 2024-05-20
Attending: FAMILY MEDICINE

## 2024-05-20 ENCOUNTER — TELEPHONE (OUTPATIENT)
Facility: CLINIC | Age: 24
End: 2024-05-20

## 2024-05-20 ENCOUNTER — OFFICE VISIT (OUTPATIENT)
Facility: CLINIC | Age: 24
End: 2024-05-20

## 2024-05-20 VITALS
WEIGHT: 210.38 LBS | SYSTOLIC BLOOD PRESSURE: 100 MMHG | DIASTOLIC BLOOD PRESSURE: 70 MMHG | OXYGEN SATURATION: 97 % | HEART RATE: 74 BPM | BODY MASS INDEX: 35 KG/M2

## 2024-05-20 DIAGNOSIS — N84.0 UTERINE POLYP: ICD-10-CM

## 2024-05-20 DIAGNOSIS — E55.9 VITAMIN D DEFICIENCY: ICD-10-CM

## 2024-05-20 DIAGNOSIS — Z00.00 ENCOUNTER FOR GENERAL ADULT MEDICAL EXAMINATION W/O ABNORMAL FINDINGS: ICD-10-CM

## 2024-05-20 DIAGNOSIS — N92.6 IRREGULAR PERIODS: ICD-10-CM

## 2024-05-20 DIAGNOSIS — F41.9 ANXIETY: ICD-10-CM

## 2024-05-20 DIAGNOSIS — E78.00 PURE HYPERCHOLESTEROLEMIA: ICD-10-CM

## 2024-05-20 DIAGNOSIS — Z00.00 ENCOUNTER FOR GENERAL ADULT MEDICAL EXAMINATION W/O ABNORMAL FINDINGS: Primary | ICD-10-CM

## 2024-05-20 DIAGNOSIS — E28.2 PCOS (POLYCYSTIC OVARIAN SYNDROME): ICD-10-CM

## 2024-05-20 DIAGNOSIS — J45.990 MILD EXERCISE-INDUCED ASTHMA (HCC): ICD-10-CM

## 2024-05-20 DIAGNOSIS — E61.1 IRON DEFICIENCY: ICD-10-CM

## 2024-05-20 DIAGNOSIS — E66.01 CLASS 2 SEVERE OBESITY WITH SERIOUS COMORBIDITY AND BODY MASS INDEX (BMI) OF 35.0 TO 35.9 IN ADULT, UNSPECIFIED OBESITY TYPE (HCC): ICD-10-CM

## 2024-05-20 DIAGNOSIS — R11.0 NAUSEA: ICD-10-CM

## 2024-05-20 LAB
ALBUMIN SERPL-MCNC: 4.7 G/DL (ref 3.2–4.8)
ALBUMIN/GLOB SERPL: 1.6 {RATIO} (ref 1–2)
ALP LIVER SERPL-CCNC: 43 U/L
ALT SERPL-CCNC: 15 U/L
ANION GAP SERPL CALC-SCNC: 9 MMOL/L (ref 0–18)
AST SERPL-CCNC: 19 U/L (ref ?–34)
BASOPHILS # BLD AUTO: 0.05 X10(3) UL (ref 0–0.2)
BASOPHILS NFR BLD AUTO: 0.6 %
BILIRUB SERPL-MCNC: 0.3 MG/DL (ref 0.3–1.2)
BUN BLD-MCNC: 10 MG/DL (ref 9–23)
BUN/CREAT SERPL: 13.9 (ref 10–20)
CALCIUM BLD-MCNC: 10 MG/DL (ref 8.7–10.4)
CHLORIDE SERPL-SCNC: 109 MMOL/L (ref 98–112)
CHOLEST SERPL-MCNC: 261 MG/DL (ref ?–200)
CO2 SERPL-SCNC: 22 MMOL/L (ref 21–32)
CREAT BLD-MCNC: 0.72 MG/DL
DEPRECATED HBV CORE AB SER IA-ACNC: 12.3 NG/ML
DEPRECATED RDW RBC AUTO: 42.9 FL (ref 35.1–46.3)
EGFRCR SERPLBLD CKD-EPI 2021: 120 ML/MIN/1.73M2 (ref 60–?)
EOSINOPHIL # BLD AUTO: 0.18 X10(3) UL (ref 0–0.7)
EOSINOPHIL NFR BLD AUTO: 2.2 %
ERYTHROCYTE [DISTWIDTH] IN BLOOD BY AUTOMATED COUNT: 14.6 % (ref 11–15)
EST. AVERAGE GLUCOSE BLD GHB EST-MCNC: 108 MG/DL (ref 68–126)
FASTING PATIENT LIPID ANSWER: YES
FASTING STATUS PATIENT QL REPORTED: YES
GLOBULIN PLAS-MCNC: 2.9 G/DL (ref 2–3.5)
GLUCOSE BLD-MCNC: 75 MG/DL (ref 70–99)
HBA1C MFR BLD: 5.4 % (ref ?–5.7)
HCT VFR BLD AUTO: 43 %
HDLC SERPL-MCNC: 49 MG/DL (ref 40–59)
HGB BLD-MCNC: 14.3 G/DL
IMM GRANULOCYTES # BLD AUTO: 0.03 X10(3) UL (ref 0–1)
IMM GRANULOCYTES NFR BLD: 0.4 %
IRON SATN MFR SERPL: 10 %
IRON SERPL-MCNC: 45 UG/DL
LDLC SERPL CALC-MCNC: 185 MG/DL (ref ?–100)
LYMPHOCYTES # BLD AUTO: 3.71 X10(3) UL (ref 1–4)
LYMPHOCYTES NFR BLD AUTO: 45.5 %
MCH RBC QN AUTO: 26.9 PG (ref 26–34)
MCHC RBC AUTO-ENTMCNC: 33.3 G/DL (ref 31–37)
MCV RBC AUTO: 81 FL
MONOCYTES # BLD AUTO: 0.27 X10(3) UL (ref 0.1–1)
MONOCYTES NFR BLD AUTO: 3.3 %
NEUTROPHILS # BLD AUTO: 3.91 X10 (3) UL (ref 1.5–7.7)
NEUTROPHILS # BLD AUTO: 3.91 X10(3) UL (ref 1.5–7.7)
NEUTROPHILS NFR BLD AUTO: 48 %
NONHDLC SERPL-MCNC: 212 MG/DL (ref ?–130)
OSMOLALITY SERPL CALC.SUM OF ELEC: 288 MOSM/KG (ref 275–295)
PLATELET # BLD AUTO: 326 10(3)UL (ref 150–450)
POTASSIUM SERPL-SCNC: 4 MMOL/L (ref 3.5–5.1)
PROT SERPL-MCNC: 7.6 G/DL (ref 5.7–8.2)
RBC # BLD AUTO: 5.31 X10(6)UL
SODIUM SERPL-SCNC: 140 MMOL/L (ref 136–145)
TIBC SERPL-MCNC: 435 UG/DL (ref 250–425)
TRANSFERRIN SERPL-MCNC: 292 MG/DL (ref 250–380)
TRIGL SERPL-MCNC: 145 MG/DL (ref 30–149)
TSI SER-ACNC: 1.05 MIU/ML (ref 0.55–4.78)
VIT D+METAB SERPL-MCNC: 32 NG/ML (ref 30–100)
VLDLC SERPL CALC-MCNC: 30 MG/DL (ref 0–30)
WBC # BLD AUTO: 8.2 X10(3) UL (ref 4–11)

## 2024-05-20 PROCEDURE — 82728 ASSAY OF FERRITIN: CPT | Performed by: FAMILY MEDICINE

## 2024-05-20 PROCEDURE — 3074F SYST BP LT 130 MM HG: CPT | Performed by: FAMILY MEDICINE

## 2024-05-20 PROCEDURE — 80061 LIPID PANEL: CPT | Performed by: FAMILY MEDICINE

## 2024-05-20 PROCEDURE — 83036 HEMOGLOBIN GLYCOSYLATED A1C: CPT | Performed by: FAMILY MEDICINE

## 2024-05-20 PROCEDURE — 83540 ASSAY OF IRON: CPT | Performed by: FAMILY MEDICINE

## 2024-05-20 PROCEDURE — 84466 ASSAY OF TRANSFERRIN: CPT | Performed by: FAMILY MEDICINE

## 2024-05-20 PROCEDURE — 99213 OFFICE O/P EST LOW 20 MIN: CPT | Performed by: FAMILY MEDICINE

## 2024-05-20 PROCEDURE — 99395 PREV VISIT EST AGE 18-39: CPT | Performed by: FAMILY MEDICINE

## 2024-05-20 PROCEDURE — 3078F DIAST BP <80 MM HG: CPT | Performed by: FAMILY MEDICINE

## 2024-05-20 PROCEDURE — 80050 GENERAL HEALTH PANEL: CPT | Performed by: FAMILY MEDICINE

## 2024-05-20 PROCEDURE — 82306 VITAMIN D 25 HYDROXY: CPT | Performed by: FAMILY MEDICINE

## 2024-05-20 RX ORDER — ESCITALOPRAM OXALATE 5 MG/1
5 TABLET ORAL DAILY
Qty: 90 TABLET | Refills: 3 | Status: SHIPPED | OUTPATIENT
Start: 2024-05-20

## 2024-05-20 RX ORDER — ORAL SEMAGLUTIDE 14 MG/1
1 TABLET ORAL DAILY
Qty: 90 TABLET | Refills: 3 | Status: SHIPPED | OUTPATIENT
Start: 2024-05-20

## 2024-05-20 RX ORDER — PHENTERMINE HYDROCHLORIDE 37.5 MG/1
37.5 TABLET ORAL
Qty: 30 TABLET | Refills: 3 | Status: SHIPPED | OUTPATIENT
Start: 2024-05-20

## 2024-05-20 RX ORDER — ESCITALOPRAM OXALATE 10 MG/1
10 TABLET ORAL DAILY
Qty: 90 TABLET | Refills: 3 | Status: SHIPPED | OUTPATIENT
Start: 2024-05-20

## 2024-05-20 RX ORDER — METFORMIN HYDROCHLORIDE 500 MG/1
1000 TABLET, EXTENDED RELEASE ORAL DAILY
Qty: 180 TABLET | Refills: 3 | Status: SHIPPED | OUTPATIENT
Start: 2024-05-20

## 2024-05-20 NOTE — PROGRESS NOTES
Subjective:   Michael Hoffman is a 23 year old female who presents for Physical (Patient is requesting annual physical exam. ) and Mental Health Problem (Patient wants to follow up on Lexapro. )     Patient presents for annual physical    Anxiety  Doing well on lexapro    Asthma  Well controlled. On albuterol as needed and Symbicort    Vitamin D deficiency  Hx of this. Takes vitamin D    Iron deficiency anemia  Hx of this. Taking iron    Uterine polyp  Patient had been taking continuous broth control for about 4 months then began to have significant bleeding every 3 days for about a month. Saw Dr Humphreys and midwife practice. Dr Humphreys found that patient uterine polyp. Patient has since not had any further irregular bleeding. Has polypectomy scheduled for June 17th and is considering IUD insertion.     Obesity  Patient has been on rybelsus and metformin. Has noted a plateu of effect. Did not tolerate wegovy due to severe nausea. Prefers not to trial injectables. Wondering what other options there are    History/Other:    Chief Complaint Reviewed and Verified  Nursing Notes Reviewed and   Verified  Tobacco Reviewed  Allergies Reviewed  Medications Reviewed    Problem List Reviewed  Medical History Reviewed  Surgical History   Reviewed  Family History Reviewed  Social History Reviewed         Tobacco:  She has never smoked tobacco.    Current Outpatient Medications   Medication Sig Dispense Refill    Phentermine HCl 37.5 MG Oral Tab Take 1 tablet (37.5 mg total) by mouth every morning before breakfast. 30 tablet 3    escitalopram (LEXAPRO) 10 MG Oral Tab Take 1 tablet (10 mg total) by mouth daily. 90 tablet 3    escitalopram 5 MG Oral Tab Take 1 tablet (5 mg total) by mouth daily. 90 tablet 3    Semaglutide (RYBELSUS) 14 MG Oral Tab Take 1 tablet by mouth daily. 90 tablet 3    norgestrel-ethinyl estradiol 0.3-30 MG-MCG Oral Tab Take 1 tablet by mouth daily. Do not take placebos 84 tablet 4     metFORMIN  MG Oral Tablet 24 Hr Take 2 tablets (1,000 mg total) by mouth daily. 180 tablet 3    ondansetron 4 MG Oral Tablet Dispersible Take 1 tablet (4 mg total) by mouth every 8 (eight) hours as needed for Nausea. 30 tablet 0    albuterol 108 (90 Base) MCG/ACT Inhalation Aero Soln Inhale 2 puffs into the lungs as needed for Shortness of Breath.      cholecalciferol 25 MCG (1000 UT) Oral Cap Take 1 capsule (1,000 Units total) by mouth daily.      ascorbic acid 500 MG Oral Tab Take 1 tablet (500 mg total) by mouth every other day.      azelastine 0.1 % Nasal Solution 2 sprays by Nasal route as needed for Rhinitis.      Budesonide-Formoterol Fumarate 80-4.5 MCG/ACT Inhalation Aerosol Inhale 2 puffs into the lungs as needed.      EPINEPHrine 0.3 MG/0.3ML Injection Solution Auto-injector Inject 0.3 mL (1 each total) into the muscle as needed.      Ferrous Sulfate 325 (65 Fe) MG Oral Tab Take 1 tablet (325 mg total) by mouth daily.      levocetirizine 5 MG Oral Tab Take 1 tablet (5 mg total) by mouth every evening.           Review of Systems:  Review of Systems   Constitutional: Negative.    HENT: Negative.     Eyes: Negative.    Respiratory: Negative.     Cardiovascular: Negative.    Gastrointestinal: Negative.    Genitourinary: Negative.    Musculoskeletal: Negative.    Skin: Negative.    Neurological: Negative.    Psychiatric/Behavioral: Negative.         Objective:   /70 (BP Location: Left arm, Patient Position: Sitting, Cuff Size: adult)   Pulse 74   Wt 210 lb 6 oz (95.4 kg)   LMP 03/28/2024   SpO2 97%   BMI 35.01 kg/m²  Estimated body mass index is 35.01 kg/m² as calculated from the following:    Height as of 4/16/24: 5' 5\" (1.651 m).    Weight as of this encounter: 210 lb 6 oz (95.4 kg).  Physical Exam  Vitals and nursing note reviewed.   Constitutional:       General: She is not in acute distress.     Appearance: Normal appearance. She is not ill-appearing.   HENT:      Head: Normocephalic and  atraumatic.      Right Ear: Tympanic membrane normal. There is no impacted cerumen.      Left Ear: Tympanic membrane normal. There is no impacted cerumen.      Mouth/Throat:      Mouth: Mucous membranes are moist.      Pharynx: Oropharynx is clear. No oropharyngeal exudate or posterior oropharyngeal erythema.   Eyes:      General:         Right eye: No discharge.         Left eye: No discharge.      Extraocular Movements: Extraocular movements intact.      Pupils: Pupils are equal, round, and reactive to light.   Cardiovascular:      Rate and Rhythm: Normal rate and regular rhythm.      Heart sounds: Normal heart sounds. No murmur heard.     No friction rub. No gallop.   Pulmonary:      Effort: Pulmonary effort is normal.      Breath sounds: Normal breath sounds. No wheezing, rhonchi or rales.   Abdominal:      General: Abdomen is flat. Bowel sounds are normal. There is no distension.      Palpations: Abdomen is soft. There is no mass.      Tenderness: There is no abdominal tenderness. There is no guarding or rebound.   Musculoskeletal:         General: Normal range of motion.      Cervical back: Normal range of motion.      Right lower leg: No edema.      Left lower leg: No edema.   Skin:     General: Skin is warm and dry.      Findings: No rash.   Neurological:      General: No focal deficit present.      Mental Status: She is alert. Mental status is at baseline.   Psychiatric:         Mood and Affect: Mood normal.         Behavior: Behavior normal.         Assessment & Plan:   1. Encounter for general adult medical examination w/o abnormal findings (Primary)  -     CBC With Differential With Platelet; Future; Expected date: 05/20/2024  -     Lipid Panel; Future; Expected date: 05/20/2024  -     Hemoglobin A1C; Future; Expected date: 05/20/2024  -     Vitamin D; Future; Expected date: 08/20/2024  -     TSH W Reflex To Free T4; Future; Expected date: 05/20/2024  -     Comp Metabolic Panel (14); Future; Expected  date: 05/20/2024    -ordered annual labs    2. Mild exercise-induced asthma (HCC)  -well controlled. No change to current medicaiton regimen    3. Anxiety    Orders:  -     Escitalopram Oxalate; Take 1 tablet (10 mg total) by mouth daily.  Dispense: 90 tablet; Refill: 3  -     Escitalopram Oxalate; Take 1 tablet (5 mg total) by mouth daily.  Dispense: 90 tablet; Refill: 3    -well controlled. Refilled lexapro 15 mg    4. Vitamin D deficiency    Orders:  -     Vitamin D; Future; Expected date: 08/20/2024    -supplementing. Ordered dated levels    5. Iron deficiency  Orders:  -     Iron And Tibc; Future; Expected date: 05/20/2024  -     Ferritin; Future; Expected date: 05/20/2024    -supplementing with iron. Ordered updated levels    6. Pure hypercholesterolemia    -history of this. Ordered updated levels    7. Uterine polyp  -following with ob/gyn    8. Class 2 severe obesity with serious comorbidity and body mass index (BMI) of 35.0 to 35.9 in adult, unspecified obesity type (HCC)  -     Phentermine HCl; Take 1 tablet (37.5 mg total) by mouth every morning before breakfast.  Dispense: 30 tablet; Refill: 3  -     Rybelsus; Take 1 tablet by mouth daily.  Dispense: 90 tablet; Refill: 3  -     metFORMIN HCl ER; Take 2 tablets (1,000 mg total) by mouth daily.  Dispense: 180 tablet; Refill: 3    -discussed options of topiramate, adipex, or wellbutrin/naltrexone. Patient opts to trial adipex    9. PCOS (polycystic ovarian syndrome)    Orders:  -     Rybelsus; Take 1 tablet by mouth daily.  Dispense: 90 tablet; Refill: 3  -     metFORMIN HCl ER; Take 2 tablets (1,000 mg total) by mouth daily.  Dispense: 180 tablet; Refill: 3    -stable doing well. Refilled meds    10. Irregular periods  -     Norgestrel-Ethinyl Estradiol; Take 1 tablet by mouth daily. Do not take placebos  Dispense: 84 tablet; Refill: 4    -again following with ob/gyn. To continue with ocps for now while awaiting polypectomy    Return in about 4 weeks  (around 6/17/2024) for Adipex follow up.    Shilpa Corona MD, 5/20/2024, 9:04 AM

## 2024-05-20 NOTE — TELEPHONE ENCOUNTER
Approved    Prior authorization approved  Payer: [facility] [place] Case ID: [id number]    [phone]    [phone]  Note from payer: Your PA request has been approved.  Additional information will be provided in the approval communication. (Message 1149)  Approval Details    Authorized from April 20, 2024 to November 16, 2024  Electronic appeal: Not supported  View History

## 2024-06-03 RX ORDER — ONDANSETRON 4 MG/1
4 TABLET, ORALLY DISINTEGRATING ORAL EVERY 8 HOURS PRN
Qty: 30 TABLET | Refills: 0 | OUTPATIENT
Start: 2024-06-03

## 2024-06-03 NOTE — TELEPHONE ENCOUNTER
Pt canceled EGD in February and requires a follow up visit with me for ongoing symptoms. Please contact patient to make follow up appt.

## 2024-06-03 NOTE — TELEPHONE ENCOUNTER
Requested Prescriptions     Pending Prescriptions Disp Refills    ondansetron 4 MG Oral Tablet Dispersible 30 tablet 0     Sig: Take 1 tablet (4 mg total) by mouth every 8 (eight) hours as needed for Nausea.        LOV   12/1/2023    LR   12/1/2023

## 2024-06-03 NOTE — TELEPHONE ENCOUNTER
Spoke with this patient and they patient does not want the refill and declined to make a follow up appointment at this time     Patient was asked to contact her pharmacy and request that not future refill request be sent

## 2024-06-12 ENCOUNTER — TELEPHONE (OUTPATIENT)
Dept: OBGYN CLINIC | Facility: CLINIC | Age: 24
End: 2024-06-12

## 2024-06-12 DIAGNOSIS — N84.0 UTERINE POLYP: Primary | ICD-10-CM

## 2024-06-12 NOTE — TELEPHONE ENCOUNTER
Received called from [place] at pre-admission testing. Per anesthesia protocol, patient is to stop Phentermine medication 7 days prior to surgery. Patient has taken dose already today 6/12, and will need to be rescheduled accordingly.    Please provide preferred alternative scheduling dates.

## 2024-06-18 ENCOUNTER — OFFICE VISIT (OUTPATIENT)
Facility: CLINIC | Age: 24
End: 2024-06-18

## 2024-06-18 VITALS
OXYGEN SATURATION: 97 % | HEART RATE: 111 BPM | WEIGHT: 206.25 LBS | BODY MASS INDEX: 34 KG/M2 | SYSTOLIC BLOOD PRESSURE: 106 MMHG | DIASTOLIC BLOOD PRESSURE: 70 MMHG

## 2024-06-18 DIAGNOSIS — E66.01 CLASS 2 SEVERE OBESITY WITH SERIOUS COMORBIDITY AND BODY MASS INDEX (BMI) OF 35.0 TO 35.9 IN ADULT, UNSPECIFIED OBESITY TYPE (HCC): Primary | ICD-10-CM

## 2024-06-18 PROCEDURE — 3074F SYST BP LT 130 MM HG: CPT | Performed by: FAMILY MEDICINE

## 2024-06-18 PROCEDURE — 99213 OFFICE O/P EST LOW 20 MIN: CPT | Performed by: FAMILY MEDICINE

## 2024-06-18 PROCEDURE — 3078F DIAST BP <80 MM HG: CPT | Performed by: FAMILY MEDICINE

## 2024-06-18 RX ORDER — PHENTERMINE HYDROCHLORIDE 15 MG/1
15 CAPSULE ORAL EVERY MORNING
Qty: 30 CAPSULE | Refills: 3 | Status: SHIPPED | OUTPATIENT
Start: 2024-06-18

## 2024-06-18 NOTE — PROGRESS NOTES
Subjective:   Michael Hoffman is a 23 year old female who presents for Medication Follow-Up (Patient comes to the office to follow up on Adipex. )     Adipex follow up  Patient doing well. No adverse effects. Has lost 4 lbs since starting. Is only taking half a tablet and finding effective.     History/Other:    Chief Complaint Reviewed and Verified  Nursing Notes Reviewed and   Verified  Tobacco Reviewed  Allergies Reviewed  Medications Reviewed    Problem List Reviewed  Medical History Reviewed  Surgical History   Reviewed  Family History Reviewed  Social History Reviewed         Tobacco:  She has never smoked tobacco.    Current Outpatient Medications   Medication Sig Dispense Refill    Phentermine HCl 15 MG Oral Cap Take 1 capsule (15 mg total) by mouth every morning. 30 capsule 3    escitalopram (LEXAPRO) 10 MG Oral Tab Take 1 tablet (10 mg total) by mouth daily. 90 tablet 3    escitalopram 5 MG Oral Tab Take 1 tablet (5 mg total) by mouth daily. 90 tablet 3    Semaglutide (RYBELSUS) 14 MG Oral Tab Take 1 tablet by mouth daily. (Patient taking differently: Take 1 tablet by mouth daily.) 90 tablet 3    norgestrel-ethinyl estradiol 0.3-30 MG-MCG Oral Tab Take 1 tablet by mouth daily. Do not take placebos 84 tablet 4    metFORMIN  MG Oral Tablet 24 Hr Take 2 tablets (1,000 mg total) by mouth daily. (Patient taking differently: Take 2 tablets (1,000 mg total) by mouth daily.) 180 tablet 3    ondansetron 4 MG Oral Tablet Dispersible Take 1 tablet (4 mg total) by mouth every 8 (eight) hours as needed for Nausea. 30 tablet 0    albuterol 108 (90 Base) MCG/ACT Inhalation Aero Soln Inhale 2 puffs into the lungs as needed for Shortness of Breath.      cholecalciferol 25 MCG (1000 UT) Oral Cap Take 1 capsule (1,000 Units total) by mouth daily.      ascorbic acid 500 MG Oral Tab Take 1 tablet (500 mg total) by mouth every other day.      azelastine 0.1 % Nasal Solution 2 sprays by Nasal route as  needed for Rhinitis.      Budesonide-Formoterol Fumarate 80-4.5 MCG/ACT Inhalation Aerosol Inhale 2 puffs into the lungs as needed.      EPINEPHrine 0.3 MG/0.3ML Injection Solution Auto-injector Inject 0.3 mL (1 each total) into the muscle as needed.      Ferrous Sulfate 325 (65 Fe) MG Oral Tab Take 1 tablet (325 mg total) by mouth daily.      levocetirizine 5 MG Oral Tab Take 1 tablet (5 mg total) by mouth every evening.           Review of Systems:  Review of Systems   Constitutional: Negative.    Respiratory: Negative.     Cardiovascular: Negative.    Gastrointestinal: Negative.    Skin: Negative.    Neurological: Negative.          Objective:   /70 (BP Location: Left arm, Patient Position: Sitting, Cuff Size: adult)   Pulse 111   Wt 206 lb 4 oz (93.6 kg)   LMP 06/18/2024   SpO2 97%   BMI 34.32 kg/m²  Estimated body mass index is 34.32 kg/m² as calculated from the following:    Height as of 6/12/24: 5' 5\" (1.651 m).    Weight as of this encounter: 206 lb 4 oz (93.6 kg).  Wt Readings from Last 5 Encounters:   06/18/24 206 lb 4 oz (93.6 kg)   05/20/24 210 lb 6 oz (95.4 kg)   06/12/24 210 lb (95.3 kg)   03/21/24 213 lb (96.6 kg)   02/29/24 216 lb (98 kg)       Physical Exam  Vitals and nursing note reviewed.   Constitutional:       General: She is not in acute distress.     Appearance: Normal appearance.   HENT:      Head: Normocephalic and atraumatic.   Eyes:      General:         Right eye: No discharge.         Left eye: No discharge.      Comments: Extraocular eye movements grossly intact   Pulmonary:      Effort: Pulmonary effort is normal.   Musculoskeletal:      Comments: Normal gait   Skin:     Findings: No rash.   Neurological:      General: No focal deficit present.      Mental Status: She is alert. Mental status is at baseline.   Psychiatric:         Mood and Affect: Mood normal.         Behavior: Behavior normal.           Assessment & Plan:   1. Class 2 severe obesity with serious comorbidity  and body mass index (BMI) of 35.0 to 35.9 in adult, unspecified obesity type (HCC) (Primary)  -     Phentermine HCl; Take 1 capsule (15 mg total) by mouth every morning.  Dispense: 30 capsule; Refill: 3  -Doing well on phentermine 15 mg. Was cutting 37.5 mg tablets in half. Sent in 15 mg capsules so no longer has to cut in half.       Return in about 3 months (around 9/19/2024) for Adipex follow up.    Shilpa Corona MD, 6/18/2024, 9:35 AM

## 2024-08-03 ENCOUNTER — PATIENT MESSAGE (OUTPATIENT)
Dept: OBGYN CLINIC | Facility: CLINIC | Age: 24
End: 2024-08-03

## 2024-08-05 NOTE — TELEPHONE ENCOUNTER
From: Michael Hoffman  To: Sierra Mackenzie  Sent: 8/3/2024 4:32 PM CDT  Subject: Birth control before IUD insertion    Hi,    Do I need to stop my birth control pill before getting the IUD on Aug 16?

## 2024-08-15 NOTE — H&P
GYN H&P     8/15/2024  6:42 PM    CC: Patient is here for hysteroscopy, endometrial polypectomy, Mirena IUD placment    HPI: Patient is a 23 year old  with abnormal uterine bleeding. Sonohys showed endometrial polyp.         Patient's last menstrual period was 2024.    OB History    Para Term  AB Living   0 0 0 0 0 0   SAB IAB Ectopic Multiple Live Births   0 0 0 0 0       GYN hx:             Past Medical History:    Anesthesia complication    HYSTERICAL AFTER KETAMINE FOR WISDOM TEETH    Anxiety    Asthma (HCC)    Environmental allergies    Esophageal reflux    Irregular periods    Obesity    PCOS (polycystic ovarian syndrome)     Past Surgical History:   Procedure Laterality Date    Patient denies any surgical history      [place] teeth removed       Allergies   Allergen Reactions    Shellfish Allergy HIVES, ITCHING and PAIN    Shrimp HIVES    Seasonal ITCHING     ITCHY EYES, RUNNY NOSE, ETC.      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 See active list.     Ht 65\"   Wt 195 lb (88.5 kg)   LMP 06/18/2024   BMI 32.45 kg/m²       Exam:   GENERAL: well developed, well nourished, in no apparent distress  SKIN: no rashes, no suspicious lesions  HEENT: normal  NECK: supple; no thyroidmegaly, no adenopathy  BREASTS: symmetrical, nontender, no palpable masses or nodes, no nipple discharge, no skin changes, no dippling, no palpable axillary adenopathy  ABDOMEN: Soft, non distended; non tender, no masses.  Liver and spleen non-tender, no enlargement. No palpable hernias  GYNE/:  External Genitalia: Normal appearing, no lesions, normal hair distribution   Urethral meatus appear wnl, no abnormal discharge or lesions noted.   Bladder: well supported, urethra wnl, no palpable tenderness or masses, no discharge  Vagina: normal pink mucosa, no lesions, normal clear discharge.   Uterus: midline, mobile, non-tender, firm and smooth  Cervix: pink, no lesions grossly visible, no discharge  Adnexa: non tender, no palpable masses, normal size  Anus:  No lesions or visible hemorrhoids    Saline infused hysterosonogram performed.   Double endometrial wall combined measurement is 6mm   Probable 0.7 cm polyp seen near ANDRE       A/P: Patient is 23 year old female with abnormal uterine bleeding and polyp seen on USN here for hysteroscopic endometrial polypectomy. I discussed with the patient  the procedure including the preop/procedure/postop course and the risks of  bleeding, infection, damage to internal organs.  All questions were answered, and written information was given to the pt regarding the procedure.    She would also like Mirena IUD placed today under anesthesia and is aware of the risks.     Sierra Mackenzie MD

## 2024-08-16 ENCOUNTER — HOSPITAL ENCOUNTER (OUTPATIENT)
Facility: HOSPITAL | Age: 24
Setting detail: HOSPITAL OUTPATIENT SURGERY
Discharge: HOME | End: 2024-08-16
Attending: OBSTETRICS & GYNECOLOGY | Admitting: OBSTETRICS & GYNECOLOGY
Payer: COMMERCIAL

## 2024-08-16 ENCOUNTER — ANESTHESIA (OUTPATIENT)
Dept: SURGERY | Facility: HOSPITAL | Age: 24
End: 2024-08-16
Payer: COMMERCIAL

## 2024-08-16 ENCOUNTER — ANESTHESIA EVENT (OUTPATIENT)
Dept: SURGERY | Facility: HOSPITAL | Age: 24
End: 2024-08-16
Payer: COMMERCIAL

## 2024-08-16 VITALS
OXYGEN SATURATION: 95 % | BODY MASS INDEX: 32.49 KG/M2 | DIASTOLIC BLOOD PRESSURE: 63 MMHG | RESPIRATION RATE: 16 BRPM | HEIGHT: 65 IN | SYSTOLIC BLOOD PRESSURE: 104 MMHG | TEMPERATURE: 98 F | WEIGHT: 195 LBS | HEART RATE: 78 BPM

## 2024-08-16 DIAGNOSIS — N84.0 UTERINE POLYP: ICD-10-CM

## 2024-08-16 LAB — B-HCG UR QL: NEGATIVE

## 2024-08-16 PROCEDURE — 58558 HYSTEROSCOPY BIOPSY: CPT | Performed by: OBSTETRICS & GYNECOLOGY

## 2024-08-16 PROCEDURE — 0UH97HZ INSERTION OF CONTRACEPTIVE DEVICE INTO UTERUS, VIA NATURAL OR ARTIFICIAL OPENING: ICD-10-PCS | Performed by: OBSTETRICS & GYNECOLOGY

## 2024-08-16 PROCEDURE — 0UDB8ZX EXTRACTION OF ENDOMETRIUM, VIA NATURAL OR ARTIFICIAL OPENING ENDOSCOPIC, DIAGNOSTIC: ICD-10-PCS | Performed by: OBSTETRICS & GYNECOLOGY

## 2024-08-16 PROCEDURE — 58300 INSERT INTRAUTERINE DEVICE: CPT | Performed by: OBSTETRICS & GYNECOLOGY

## 2024-08-16 DEVICE — MIRENA IUD: Type: IMPLANTABLE DEVICE | Site: VAGINA | Status: FUNCTIONAL

## 2024-08-16 RX ORDER — FAMOTIDINE 20 MG/1
20 TABLET, FILM COATED ORAL ONCE
Status: COMPLETED | OUTPATIENT
Start: 2024-08-16 | End: 2024-08-16

## 2024-08-16 RX ORDER — MORPHINE SULFATE 4 MG/ML
2 INJECTION, SOLUTION INTRAMUSCULAR; INTRAVENOUS EVERY 10 MIN PRN
Status: DISCONTINUED | OUTPATIENT
Start: 2024-08-16 | End: 2024-08-16

## 2024-08-16 RX ORDER — HYDROMORPHONE HYDROCHLORIDE 1 MG/ML
0.2 INJECTION, SOLUTION INTRAMUSCULAR; INTRAVENOUS; SUBCUTANEOUS EVERY 5 MIN PRN
Status: DISCONTINUED | OUTPATIENT
Start: 2024-08-16 | End: 2024-08-16

## 2024-08-16 RX ORDER — LIDOCAINE HYDROCHLORIDE 10 MG/ML
INJECTION, SOLUTION EPIDURAL; INFILTRATION; INTRACAUDAL; PERINEURAL AS NEEDED
Status: DISCONTINUED | OUTPATIENT
Start: 2024-08-16 | End: 2024-08-16 | Stop reason: SURG

## 2024-08-16 RX ORDER — DEXAMETHASONE SODIUM PHOSPHATE 4 MG/ML
VIAL (ML) INJECTION AS NEEDED
Status: DISCONTINUED | OUTPATIENT
Start: 2024-08-16 | End: 2024-08-16 | Stop reason: SURG

## 2024-08-16 RX ORDER — ONDANSETRON 2 MG/ML
INJECTION INTRAMUSCULAR; INTRAVENOUS AS NEEDED
Status: DISCONTINUED | OUTPATIENT
Start: 2024-08-16 | End: 2024-08-16 | Stop reason: SURG

## 2024-08-16 RX ORDER — HYDROMORPHONE HYDROCHLORIDE 1 MG/ML
0.6 INJECTION, SOLUTION INTRAMUSCULAR; INTRAVENOUS; SUBCUTANEOUS EVERY 5 MIN PRN
Status: DISCONTINUED | OUTPATIENT
Start: 2024-08-16 | End: 2024-08-16

## 2024-08-16 RX ORDER — ONDANSETRON 2 MG/ML
4 INJECTION INTRAMUSCULAR; INTRAVENOUS EVERY 8 HOURS PRN
Status: CANCELLED | OUTPATIENT
Start: 2024-08-16

## 2024-08-16 RX ORDER — LIDOCAINE HYDROCHLORIDE 40 MG/ML
SOLUTION TOPICAL AS NEEDED
Status: DISCONTINUED | OUTPATIENT
Start: 2024-08-16 | End: 2024-08-16 | Stop reason: SURG

## 2024-08-16 RX ORDER — FAMOTIDINE 10 MG/ML
20 INJECTION, SOLUTION INTRAVENOUS ONCE
Status: COMPLETED | OUTPATIENT
Start: 2024-08-16 | End: 2024-08-16

## 2024-08-16 RX ORDER — MIDAZOLAM HYDROCHLORIDE 1 MG/ML
INJECTION INTRAMUSCULAR; INTRAVENOUS AS NEEDED
Status: DISCONTINUED | OUTPATIENT
Start: 2024-08-16 | End: 2024-08-16 | Stop reason: SURG

## 2024-08-16 RX ORDER — MORPHINE SULFATE 4 MG/ML
4 INJECTION, SOLUTION INTRAMUSCULAR; INTRAVENOUS EVERY 10 MIN PRN
Status: DISCONTINUED | OUTPATIENT
Start: 2024-08-16 | End: 2024-08-16

## 2024-08-16 RX ORDER — MORPHINE SULFATE 10 MG/ML
6 INJECTION, SOLUTION INTRAMUSCULAR; INTRAVENOUS EVERY 10 MIN PRN
Status: DISCONTINUED | OUTPATIENT
Start: 2024-08-16 | End: 2024-08-16

## 2024-08-16 RX ORDER — ROCURONIUM BROMIDE 10 MG/ML
INJECTION, SOLUTION INTRAVENOUS AS NEEDED
Status: DISCONTINUED | OUTPATIENT
Start: 2024-08-16 | End: 2024-08-16 | Stop reason: SURG

## 2024-08-16 RX ORDER — SODIUM CHLORIDE, SODIUM LACTATE, POTASSIUM CHLORIDE, CALCIUM CHLORIDE 600; 310; 30; 20 MG/100ML; MG/100ML; MG/100ML; MG/100ML
INJECTION, SOLUTION INTRAVENOUS CONTINUOUS
Status: DISCONTINUED | OUTPATIENT
Start: 2024-08-16 | End: 2024-08-16

## 2024-08-16 RX ORDER — NALOXONE HYDROCHLORIDE 0.4 MG/ML
80 INJECTION, SOLUTION INTRAMUSCULAR; INTRAVENOUS; SUBCUTANEOUS AS NEEDED
Status: DISCONTINUED | OUTPATIENT
Start: 2024-08-16 | End: 2024-08-16

## 2024-08-16 RX ORDER — IBUPROFEN 600 MG/1
600 TABLET, FILM COATED ORAL EVERY 6 HOURS
Status: DISCONTINUED | OUTPATIENT
Start: 2024-08-16 | End: 2024-08-16

## 2024-08-16 RX ORDER — ONDANSETRON 4 MG/1
4 TABLET, FILM COATED ORAL EVERY 8 HOURS PRN
Status: CANCELLED | OUTPATIENT
Start: 2024-08-16

## 2024-08-16 RX ORDER — ACETAMINOPHEN 500 MG
1000 TABLET ORAL ONCE
Status: COMPLETED | OUTPATIENT
Start: 2024-08-16 | End: 2024-08-16

## 2024-08-16 RX ORDER — HYDROMORPHONE HYDROCHLORIDE 1 MG/ML
0.4 INJECTION, SOLUTION INTRAMUSCULAR; INTRAVENOUS; SUBCUTANEOUS EVERY 5 MIN PRN
Status: DISCONTINUED | OUTPATIENT
Start: 2024-08-16 | End: 2024-08-16

## 2024-08-16 RX ADMIN — LIDOCAINE HYDROCHLORIDE 4 ML: 40 SOLUTION TOPICAL at 12:50:00

## 2024-08-16 RX ADMIN — MIDAZOLAM HYDROCHLORIDE 2 MG: 1 INJECTION INTRAMUSCULAR; INTRAVENOUS at 12:45:00

## 2024-08-16 RX ADMIN — DEXAMETHASONE SODIUM PHOSPHATE 8 MG: 4 MG/ML VIAL (ML) INJECTION at 13:02:00

## 2024-08-16 RX ADMIN — ONDANSETRON 4 MG: 2 INJECTION INTRAMUSCULAR; INTRAVENOUS at 13:08:00

## 2024-08-16 RX ADMIN — ROCURONIUM BROMIDE 30 MG: 10 INJECTION, SOLUTION INTRAVENOUS at 12:57:00

## 2024-08-16 RX ADMIN — ROCURONIUM BROMIDE 10 MG: 10 INJECTION, SOLUTION INTRAVENOUS at 12:47:00

## 2024-08-16 RX ADMIN — SODIUM CHLORIDE, SODIUM LACTATE, POTASSIUM CHLORIDE, CALCIUM CHLORIDE: 600; 310; 30; 20 INJECTION, SOLUTION INTRAVENOUS at 12:58:00

## 2024-08-16 RX ADMIN — SODIUM CHLORIDE, SODIUM LACTATE, POTASSIUM CHLORIDE, CALCIUM CHLORIDE: 600; 310; 30; 20 INJECTION, SOLUTION INTRAVENOUS at 12:45:00

## 2024-08-16 RX ADMIN — LIDOCAINE HYDROCHLORIDE 50 MG: 10 INJECTION, SOLUTION EPIDURAL; INFILTRATION; INTRACAUDAL; PERINEURAL at 12:47:00

## 2024-08-16 NOTE — ANESTHESIA PROCEDURE NOTES
Airway  Date/Time: 8/16/2024 12:51 PM  Urgency: elective    Airway not difficult    General Information and Staff    Patient location during procedure: OR  Resident/CRNA: Ave Cross CRNA  Performed: CRNA   Performed by: Ave Cross CRNA  Authorized by: Gee Gonzalez MD      Indications and Patient Condition  Indications for airway management: anesthesia  Spontaneous ventilation: present  Sedation level: deep  Preoxygenated: yes  Patient position: sniffing  MILS maintained throughout  Mask difficulty assessment: 0 - not attempted  No planned trial extubation    Final Airway Details  Final airway type: endotracheal airway      Successful airway: ETT  Cuffed: yes   Successful intubation technique: direct laryngoscopy  Facilitating devices/methods: cricoid pressure and rapid sequence intubation  Endotracheal tube insertion site: oral  Blade: Marc  Blade size: #3  ETT size (mm): 7.0    Cormack-Lehane Classification: grade IIA - partial view of glottis  Placement verified by: capnometry   Cuff volume (mL): 6  Measured from: lips  ETT to lips (cm): 22  Number of attempts at approach: 1  Number of other approaches attempted: 0

## 2024-08-16 NOTE — INTERVAL H&P NOTE
Pre-op Diagnosis: Uterine polyp [N84.0]    The above referenced H&P was reviewed by Sierra Mackenzie MD on 8/16/2024, the patient was examined and no significant changes have occurred in the patient's condition since the H&P was performed.  I discussed with the patient and/or legal representative the potential benefits, risks and side effects of this procedure; the likelihood of the patient achieving goals; and potential problems that might occur during recuperation.  I discussed reasonable alternatives to the procedure, including risks, benefits and side effects related to the alternatives and risks related to not receiving this procedure.  We will proceed with procedure as planned.

## 2024-08-16 NOTE — DISCHARGE INSTRUCTIONS
DISCHARGE INSTRUCTIONS    You should expect to have a watery, bloody vaginal discharge for 1 - 2 weeks    Call if fever greater than 101, worsening pain, nausea or vomiting, heavy bleeding.    You may resume exercise tomorrow    No sex tampons or douching for 1 week.    You may drive starting tomorrow    You may shower and bathe.    Hysteroscopy  Hysteroscopy is a procedure done to see inside your uterus. It can help find the cause of problems in the uterus. This helps your healthcare provider decide on the best treatment. In some cases, it can be used to perform treatment. Hysteroscopy may be done in your healthcare provider's office, outpatient surgery center, or in the hospital.          What happens after hysteroscopy?  You may have cramps and bleeding for short time after the procedure. This is normal. Use pads instead of tampons.  Don't douche or use tampons until your healthcare provider says it’s OK.  Don't use any vaginal medicines until you are told it’s OK.  Ask your healthcare provider when it’s OK to have sex again.    When to call your healthcare provider  Call your healthcare provider if any of the following occur:  Heavy bleeding (more than 1 pad an hour for 2 or more hours)  A fever of 100.4°F ( 38.0°C) or higher, or as directed by your provider  Increasing belly (abdominal) pain or soreness  Bad-smelling discharge  Follow-up care  Schedule a follow-up visit with your healthcare provider. Based on your test results, you may need more treatment. Be sure to follow directions and keep your appointments.      Discharge Instructions: After Your Surgery  You’ve just had surgery. During surgery, you were given medicine called anesthesia to keep you relaxed and free of pain. After surgery, you may have some pain or nausea. This is common. Here are some tips for feeling better and getting well after surgery.   Going home  Your healthcare provider will show you how to take care of yourself when you go home.  They'll also answer your questions. Have an adult family member or friend drive you home. For the first 24 hours after your surgery:   Don't drive or use heavy equipment.  Don't make important decisions or sign legal papers.  Take medicines as directed.  Don't drink alcohol.  Have someone stay with you, if needed. They can watch for problems and help keep you safe.  Be sure to go to all follow-up visits with your healthcare provider. And rest after your surgery for as long as your provider tells you to.   Coping with pain  If you have pain after surgery, pain medicine will help you feel better. Take it as directed, before pain becomes severe. Also, ask your healthcare provider or pharmacist about other ways to control pain. This might be with heat, ice, or relaxation. And follow any other instructions your surgeon or nurse gives you.      Stay on schedule with your medicine.     Tips for taking pain medicine  To get the best relief possible, remember these points:   Pain medicines can upset your stomach. Taking them with a little food may help.  Most pain relievers taken by mouth need at least 20 to 30 minutes to start to work.  Don't wait till your pain becomes severe before you take your medicine. Try to time your medicine so that you can take it before starting an activity. This might be before you get dressed, go for a walk, or sit down for dinner.  Constipation is a common side effect of some pain medicines. Call your healthcare provider before taking any medicines such as laxatives or stool softeners to help ease constipation. Also ask if you should skip any foods. Drinking lots of fluids and eating foods such as fruits and vegetables that are high in fiber can also help. Remember, don't take laxatives unless your surgeon has prescribed them.  Drinking alcohol and taking pain medicine can cause dizziness and slow your breathing. It can even be deadly. Don't drink alcohol while taking pain medicine.  Pain  medicine can make you react more slowly to things. Don't drive or run machinery while taking pain medicine.  Your healthcare provider may tell you to take acetaminophen to help ease your pain. Ask them how much you're supposed to take each day. Acetaminophen or other pain relievers may interact with your prescription medicines or other over-the-counter (OTC) medicines. Some prescription medicines have acetaminophen and other ingredients in them. Using both prescription and OTC acetaminophen for pain can cause you to accidentally overdose. Read the labels on your OTC medicines with care. This will help you to clearly know the list of ingredients, how much to take, and any warnings. It may also help you not take too much acetaminophen. If you have questions or don't understand the information, ask your pharmacist or healthcare provider to explain it to you before you take the OTC medicine.   Managing nausea  Some people have an upset stomach (nausea) after surgery. This is often because of anesthesia, pain, or pain medicine, less movement of food in the stomach, or the stress of surgery. These tips will help you handle nausea and eat healthy foods as you get better. If you were on a special food plan before surgery, ask your healthcare provider if you should follow it while you get better. Check with your provider on how your eating should progress. It may depend on the surgery you had. These general tips may help:   Don't push yourself to eat. Your body will tell you when to eat and how much.  Start off with clear liquids and soup. They're easier to digest.  Next try semi-solid foods as you feel ready. These include mashed potatoes, applesauce, and gelatin.  Slowly move to solid foods. Don’t eat fatty, rich, or spicy foods at first.  Don't force yourself to have 3 large meals a day. Instead eat smaller amounts more often.  Take pain medicines with a small amount of solid food, such as crackers or toast. This helps  prevent nausea.  When to call your healthcare provider  Call your healthcare provider right away if you have any of these:   You still have too much pain, or the pain gets worse, after taking the medicine. The medicine may not be strong enough. Or there may be a complication from the surgery.  You feel too sleepy, dizzy, or groggy. The medicine may be too strong.  Side effects such as nausea or vomiting. Your healthcare provider may advise taking other medicines to .  Skin changes such as rash, itching, or hives. This may mean you have an allergic reaction. Your provider may advise taking other medicines.  The incision looks different (for instance, part of it opens up).  Bleeding or fluid leaking from the incision site, and weren't told to expect that.  Fever of 100.4°F (38°C) or higher, or as directed by your provider.  Call 911  Call 911 right away if you have:   Trouble breathing  Facial swelling    If you have obstructive sleep apnea   You were given anesthesia medicine during surgery to keep you comfortable and free of pain. After surgery, you may have more apnea spells because of this medicine and other medicines you were given. The spells may last longer than normal.    At home:  Keep using the continuous positive airway pressure (CPAP) device when you sleep. Unless your healthcare provider tells you not to, use it when you sleep, day or night. CPAP is a common device used to treat obstructive sleep apnea.  Talk with your provider before taking any pain medicine, muscle relaxants, or sedatives. Your provider will tell you about the possible dangers of taking these medicines.  Contact your provider if your sleeping changes a lot even when taking medicines as directed.  [employer] last reviewed this educational content on 10/1/2021  © 9724-7528 [employer]. All rights reserved. This information is not intended as a substitute for professional medical care. Always follow your healthcare  professional's instructions.

## 2024-08-16 NOTE — OPERATIVE REPORT
[facility]    PATIENT'S NAME: CECILIO TOWNSEND   ATTENDING PHYSICIAN: Sierra Mackenzie MD   OPERATING PHYSICIAN: Sierra Mackenzie MD   PATIENT ACCOUNT#:   [id number]    LOCATION:  [facility]  MEDICAL RECORD #:   [id number]       YOB: 2000  ADMISSION DATE:       08/16/2024      OPERATION DATE:  08/16/2024    OPERATIVE REPORT      PREOPERATIVE DIAGNOSIS:  Menorrhagia.  POSTOPERATIVE DIAGNOSIS:  Menorrhagia.  PROCEDURE:  Hysteroscopy, endometrial sampling, and Mirena intrauterine device placement.    ANESTHESIA:  General with endotracheal intubation.    ESTIMATED BLOOD LOSS:  Minimal.    COMPLICATIONS:  None.    SPECIMEN:  Endometrial sampling to Pathology.    FINDINGS:  Per hysteroscopy, no visible submucous polyps or fibroids.  Normal endometrium with bilateral tubal ostia visualized.      OPERATIVE TECHNIQUE:  Patient was taken to the operating room, placed supine on the table.  General anesthesia was induced.  The patient was placed in the dorsal lithotomy position.  The perineum and vagina were prepped and draped in the usual sterile fashion.  A time-out was performed.  The bladder was drained.  The cervix was visualized with a single-sided speculum, grasped with a single-tooth tenaculum.  It was dilated and a TruClear hysteroscope with 0.9 normal saline was placed through the cervix into the uterus.  The findings were as above.  A circumferential scraping was performed with the soft tissue scraper.  All instruments removed from the vagina.  The uterus was sounded to 9 cm.  A Mirena IUD was placed through the cervix into the uterus.  It was retracted 2 cm, deployed and then placed at the top of the uterus.  The  was removed from the vagina.  The strings were cut 3 cm from the cervical os.  The patient was placed supine on the table, extubated, and taken to the recovery room in stable condition.    Dictated By Sierra Mackenzie,  MD  d: 08/16/2024 13:13:54  t: 08/16/2024 18:49:35  [facility] [id number]  /

## 2024-08-16 NOTE — ANESTHESIA PREPROCEDURE EVALUATION
Anesthesia PreOp Note    HPI:     Michael Hoffman is a 23 year old female who presents for preoperative consultation requested by: Sierra Mackenzie MD    Date of Surgery: 8/16/2024    Procedure(s):  Hysteroscopy with endometrial polypectomy, mirena intrauterine device insertion  INTRAUTERINE DEVICE INSERTION/REMOVAL  Indication: Uterine polyp [N84.0]    Relevant Problems   No relevant active problems       NPO:  Last Liquid Consumption Date: 08/15/24  Last Liquid Consumption Time: 2100  Last Solid Consumption Date: 08/15/24  Last Solid Consumption Time: 2030  Last Liquid Consumption Date: 08/15/24          History Review:  Patient Active Problem List    Diagnosis Date Noted    Uterine polyp 05/20/2024    Iron deficiency 04/21/2023    Allergy to food 01/17/2023    Seasonal allergic rhinitis 01/17/2023    Irritable bowel syndrome with diarrhea 09/30/2022    Gastroesophageal reflux disease without esophagitis 09/09/2022    Urticaria 08/30/2022    Acne 06/28/2022    Anxiety 06/28/2022    Hyperlipidemia 06/28/2022    Mild exercise-induced asthma (HCC) 06/28/2022    PCOS (polycystic ovarian syndrome) 06/28/2022    Vitamin D deficiency 06/28/2022       Past Medical History:    Anesthesia complication    HYSTERICAL AFTER KETAMINE FOR WISDOM TEETH    Anxiety    Asthma (HCC)    Environmental allergies    Esophageal reflux    Irregular periods    Obesity    PCOS (polycystic ovarian syndrome)       Past Surgical History:   Procedure Laterality Date    Patient denies any surgical history      [place] teeth removed         Medications Prior to Admission   Medication Sig Dispense Refill Last Dose    Phentermine HCl 15 MG Oral Cap Take 1 capsule (15 mg total) by mouth every morning. 30 capsule 3     escitalopram (LEXAPRO) 10 MG Oral Tab Take 1 tablet (10 mg total) by mouth daily. (Patient taking differently: Take 1 tablet (10 mg total) by mouth at bedtime.) 90 tablet 3 8/15/2024    escitalopram 5 MG Oral Tab Take 1  tablet (5 mg total) by mouth daily. (Patient taking differently: Take 1 tablet (5 mg total) by mouth at bedtime.) 90 tablet 3 8/15/2024    Semaglutide (RYBELSUS) 14 MG Oral Tab Take 1 tablet by mouth daily. (Patient taking differently: Take 1 tablet by mouth at bedtime.) 90 tablet 3 8/15/2024    norgestrel-ethinyl estradiol 0.3-30 MG-MCG Oral Tab Take 1 tablet by mouth daily. Do not take placebos 84 tablet 4 Past Week    metFORMIN  MG Oral Tablet 24 Hr Take 2 tablets (1,000 mg total) by mouth daily. (Patient taking differently: Take 2 tablets (1,000 mg total) by mouth at bedtime.) 180 tablet 3 Past Week    ondansetron 4 MG Oral Tablet Dispersible Take 1 tablet (4 mg total) by mouth every 8 (eight) hours as needed for Nausea. 30 tablet 0 Past Week    Ferrous Sulfate 325 (65 Fe) MG Oral Tab Take 1 tablet (325 mg total) by mouth daily.   Past Month    levocetirizine 5 MG Oral Tab Take 1 tablet (5 mg total) by mouth every evening.   8/15/2024    albuterol 108 (90 Base) MCG/ACT Inhalation Aero Soln Inhale 2 puffs into the lungs as needed for Shortness of Breath.   More than a month    cholecalciferol 25 MCG (1000 UT) Oral Cap Take 1 capsule (1,000 Units total) by mouth daily.   More than a month    ascorbic acid 500 MG Oral Tab Take 1 tablet (500 mg total) by mouth every other day.   More than a month    [] azelastine 0.1 % Nasal Solution 2 sprays by Nasal route as needed for Rhinitis.   More than a month    Budesonide-Formoterol Fumarate 80-4.5 MCG/ACT Inhalation Aerosol Inhale 2 puffs into the lungs as needed.   More than a month    EPINEPHrine 0.3 MG/0.3ML Injection Solution Auto-injector Inject 0.3 mL (1 each total) into the muscle as needed.   More than a month     Current Facility-Administered Medications Ordered in Epic   Medication Dose Route Frequency Provider Last Rate Last Admin    lactated ringers infusion   Intravenous Continuous Sierra Mackenzie MD 20 mL/hr at 24 1054 New Bag  at 08/16/24 1054     No current [facility]-ordered outpatient medications on file.       Allergies   Allergen Reactions    Shellfish Allergy HIVES, ITCHING and PAIN    Shrimp HIVES    Seasonal ITCHING     ITCHY EYES, RUNNY NOSE, ETC.        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  Lab Results   Component Value Date    WBC 8.2 05/20/2024    RBC 5.31 (H) 05/20/2024    HGB 14.3 05/20/2024    HCT 43.0 05/20/2024    MCV 81.0 05/20/2024    MCH 26.9 05/20/2024    MCHC 33.3 05/20/2024    RDW 14.6 05/20/2024    .0 05/20/2024    URINEPREG Negative 08/16/2024     Lab Results   Component Value Date     05/20/2024    K 4.0 05/20/2024     05/20/2024    CO2 22.0 05/20/2024    BUN 10 05/20/2024    CREATSERUM 0.72 05/20/2024    GLU 75 05/20/2024    CA 10.0 05/20/2024          Vital Signs:  Body mass index is 32.45 kg/m².   height is 1.651 m (5' 5\") and weight is 88.5 kg (195 lb). Her oral temperature is 98.1 °F (36.7 °C). Her blood pressure is 130/75 and her pulse is 80. Her  respiration is 16 and oxygen saturation is 98%.   Vitals:    06/12/24 1102 08/01/24 1458 08/16/24 1035   BP:   130/75   Pulse:   80   Resp:   16   Temp:   98.1 °F (36.7 °C)   TempSrc:   Oral   SpO2:   98%   Weight: 95.3 kg (210 lb) 88.5 kg (195 lb)    Height: 1.651 m (5' 5\") 1.651 m (5' 5\")         Anesthesia Evaluation     Patient summary reviewed and Nursing notes reviewed    No history of anesthetic complications   Airway   Mallampati: II  Neck ROM: full  Dental      Pulmonary - negative ROS and normal exam   Cardiovascular - negative ROS and normal exam    Neuro/Psych - negative ROS     GI/Hepatic/Renal - negative ROS     Endo/Other - negative ROS   Abdominal  - normal exam  (+) obese                 Anesthesia Plan:   ASA:  2  Plan:   General  Airway:  LMA  Post-op Pain Management: IV analgesics  Informed Consent Plan and Risks Discussed With:  Patient  Discussed plan with:  CRNA      I have informed Michael Hoffman and/or legal guardian or family member of the nature of the anesthetic plan, benefits, risks including possible dental damage if relevant, major complications, and any alternative forms of anesthetic management.   All of the patient's questions were answered to the best of my ability. The patient desires the anesthetic management as planned.  YUDITH PHELPS MD  8/16/2024 11:49 AM  Present on Admission:  **None**

## 2024-08-16 NOTE — BRIEF OP NOTE
Pre-Operative Diagnosis: Uterine polyp [N84.0]     Post-Operative Diagnosis: Uterine polyp [N84.0]      Procedure Performed:   Hysteroscopy with endometrial sampling, Mirena IUD placement    Surgeons and Role:     * Sierra Mackenzie MD - Primary    Assistant(s):        Surgical Findings: Normal endometrium. No visible submucous polyps or fibroids.      Specimen: endometrial sampling to path     Estimated Blood Loss: No data recorded        Sierra Mackenzie MD  8/16/2024  12:40 PM

## 2024-08-16 NOTE — ANESTHESIA POSTPROCEDURE EVALUATION
Patient: Michael Hoffman    Procedure Summary       Date: 08/16/24 Room / Location: [facility] MAIN OR  / [facility] MAIN OR    Anesthesia Start: 1245 Anesthesia Stop:     Procedures:       Hysteroscopy with endometrial polypectomy, mirena intrauterine device insertion (Vagina )      INTRAUTERINE DEVICE INSERTION/REMOVAL (Vagina ) Diagnosis:       Uterine polyp      (Uterine polyp [N84.0])    Surgeons: Sierra Mackenzie MD Anesthesiologist: Gee Gonzalez MD    Anesthesia Type: general ASA Status: 2            Anesthesia Type: general    Vitals Value Taken Time   /71 08/16/24 1333   Temp 97.2 08/16/24 1334   Pulse 102 08/16/24 1334   Resp 17 08/16/24 1334   SpO2 97 % 08/16/24 1334   Vitals shown include unfiled device data.    [facility] AN Post Evaluation:   Patient Evaluated in PACU  Patient Participation: complete - patient participated  Level of Consciousness: awake and alert  Pain Score: 0  Pain Management: adequate  Airway Patency:patent  Dental exam unchanged from preop  Yes    Nausea/Vomiting: none  Cardiovascular Status: acceptable  Respiratory Status: acceptable  Postoperative Hydration acceptable      TATYANA DOLAN CRNA  8/16/2024 1:34 PM

## 2024-08-28 ENCOUNTER — OFFICE VISIT (OUTPATIENT)
Dept: OBGYN CLINIC | Facility: CLINIC | Age: 24
End: 2024-08-28
Payer: COMMERCIAL

## 2024-08-28 VITALS
BODY MASS INDEX: 32.87 KG/M2 | WEIGHT: 197.31 LBS | DIASTOLIC BLOOD PRESSURE: 72 MMHG | HEIGHT: 65 IN | SYSTOLIC BLOOD PRESSURE: 132 MMHG

## 2024-08-28 DIAGNOSIS — Z30.431 IUD CHECK UP: ICD-10-CM

## 2024-08-28 DIAGNOSIS — Z09 POSTOP CHECK: Primary | ICD-10-CM

## 2024-08-28 PROCEDURE — 3078F DIAST BP <80 MM HG: CPT | Performed by: OBSTETRICS & GYNECOLOGY

## 2024-08-28 PROCEDURE — 3008F BODY MASS INDEX DOCD: CPT | Performed by: OBSTETRICS & GYNECOLOGY

## 2024-08-28 PROCEDURE — 3075F SYST BP GE 130 - 139MM HG: CPT | Performed by: OBSTETRICS & GYNECOLOGY

## 2024-08-28 PROCEDURE — 99024 POSTOP FOLLOW-UP VISIT: CPT | Performed by: OBSTETRICS & GYNECOLOGY

## 2024-08-28 RX ORDER — LEVONORGESTREL 52 MG/1
1 INTRAUTERINE DEVICE INTRAUTERINE ONCE
COMMUNITY

## 2024-08-28 NOTE — PROGRESS NOTES
CC: Patient is here for postop visit after hysteroscopy with Mirena IUD placement.     HPI: Patient is a 23 year old  for postop FU. No complaints. No further bleeding    Patient's last menstrual period was 2024.    OB History    Para Term  AB Living   0 0 0 0 0 0   SAB IAB Ectopic Multiple Live Births   0 0 0 0 0       GYN hx:       LPS:    Past Medical History:    Anesthesia complication    HYSTERICAL AFTER KETAMINE FOR WISDOM TEETH    Anxiety    Asthma (HCC)    Environmental allergies    Esophageal reflux    Irregular periods    Obesity    PCOS (polycystic ovarian syndrome)     Past Surgical History:   Procedure Laterality Date    Hysteroscopy,with sampling  2024    [place] teeth removed       Allergies   Allergen Reactions    Shellfish Allergy HIVES, ITCHING and PAIN    Shrimp HIVES    Seasonal ITCHING     ITCHY EYES, RUNNY NOSE, ETC.      Family History   Problem Relation Age of Onset    Thyroid disease Mother     Hypertension Mother     No Known Problems Father     No Known Problems Brother     No Known Problems Brother     Pancreatic Cancer Maternal Grandfather     Cancer Maternal Grandfather         Pancreatic cancer    Diabetes Paternal Grandfather     Substance Abuse Paternal Grandfather     Breast Cancer Neg     Ovarian Cancer Neg     Colon Cancer Neg     Uterine Cancer Neg      Social History     Socioeconomic History    Marital status: Single     Spouse name: Not on file    Number of children: Not on file    Years of education: Not on file    Highest education level: Not on file   Occupational History    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     Comment: plans to apply to med school   Tobacco Use    Smoking status: Never    Smokeless tobacco: Never   Vaping Use    Vaping status: Never Used   Substance and Sexual Activity    Alcohol use: Never    Drug use: Never    Sexual activity: Yes     Partners: Female     Birth control/protection: OCP   Other  Topics Concern     Service Not Asked    Blood Transfusions No    Caffeine Concern Not Asked    Occupational Exposure Not Asked    Hobby Hazards Not Asked    Sleep Concern Not Asked    Stress Concern Not Asked    Weight Concern Not Asked    Special Diet Not Asked    Back Care Not Asked    Exercise Not Asked    Bike Helmet Not Asked    Seat Belt Not Asked    Self-Exams Not Asked   Social History Narrative    Lives with partner Tories    No abuse     Social Determinants of Health     Financial Resource Strain: Not on file   Food Insecurity: Not on file   Transportation Needs: Not on file   Physical Activity: Not on file   Stress: Not on file   Social Connections: Not on file   Housing Stability: Not on file       Medications reviewed. See active list.     /72   Ht 65\"   Wt 197 lb 4.8 oz (89.5 kg)   LMP 06/18/2024   BMI 32.83 kg/m²       Exam:   GENERAL: well developed, well nourished, in no apparent distress  ABDOMEN: Soft, non distended; non tender, no masses.  Liver and spleen non-tender, no enlargement. No palpable hernias  GYNE/:  External Genitalia: Normal appearing, no lesions, normal hair distribution   Urethral meatus appear wnl, no abnormal discharge or lesions noted.   Bladder: well supported, urethra wnl, no palpable tenderness or masses, no discharge  Vagina: normal pink mucosa, no lesions, normal clear discharge.   Uterus: midline, mobile, non-tender, firm and smooth  Cervix: pink, no lesions grossly visible, no discharge iUD string visible 3 cm fromos  Adnexa: non tender, no palpable masses, normal size  Anus:  No lesions or visible hemorrhoids        A/P: Patient is 23 year old female     1. Postop check    2. IUD check up    Normal exam.   Sierra Mackenzie MD

## 2024-09-19 ENCOUNTER — OFFICE VISIT (OUTPATIENT)
Facility: CLINIC | Age: 24
End: 2024-09-19
Payer: COMMERCIAL

## 2024-09-19 VITALS
DIASTOLIC BLOOD PRESSURE: 72 MMHG | WEIGHT: 195 LBS | OXYGEN SATURATION: 98 % | HEIGHT: 65 IN | BODY MASS INDEX: 32.49 KG/M2 | HEART RATE: 81 BPM | SYSTOLIC BLOOD PRESSURE: 108 MMHG

## 2024-09-19 DIAGNOSIS — E66.01 CLASS 2 SEVERE OBESITY WITH SERIOUS COMORBIDITY AND BODY MASS INDEX (BMI) OF 35.0 TO 35.9 IN ADULT, UNSPECIFIED OBESITY TYPE (HCC): Primary | ICD-10-CM

## 2024-09-19 PROCEDURE — 99213 OFFICE O/P EST LOW 20 MIN: CPT | Performed by: FAMILY MEDICINE

## 2024-09-19 PROCEDURE — 3078F DIAST BP <80 MM HG: CPT | Performed by: FAMILY MEDICINE

## 2024-09-19 PROCEDURE — 3008F BODY MASS INDEX DOCD: CPT | Performed by: FAMILY MEDICINE

## 2024-09-19 PROCEDURE — 3074F SYST BP LT 130 MM HG: CPT | Performed by: FAMILY MEDICINE

## 2024-09-19 NOTE — PROGRESS NOTES
Subjective:   Michael Hoffman is a 23 year old female who presents for Medication Follow-Up (Phentermine- no concerns)     Patient presents for phentermine follow up. Doing well. Consistent steady weight loss. No side effects.     History/Other:    Chief Complaint Reviewed and Verified  Nursing Notes Reviewed and   Verified  Tobacco Reviewed  Allergies Reviewed  Medications Reviewed    Problem List Reviewed  Medical History Reviewed  Surgical History   Reviewed  OB Status Reviewed  Family History Reviewed         Tobacco:  She has never smoked tobacco.    Current Outpatient Medications   Medication Sig Dispense Refill    Levonorgestrel (MIRENA, 52 MG,) 20 MCG/DAY Intrauterine IUD 20 mcg (1 each total) by Intrauterine route one time.      Phentermine HCl 15 MG Oral Cap Take 1 capsule (15 mg total) by mouth every morning. 30 capsule 3    escitalopram (LEXAPRO) 10 MG Oral Tab Take 1 tablet (10 mg total) by mouth daily. (Patient taking differently: Take 1 tablet (10 mg total) by mouth at bedtime.) 90 tablet 3    escitalopram 5 MG Oral Tab Take 1 tablet (5 mg total) by mouth daily. (Patient taking differently: Take 1 tablet (5 mg total) by mouth at bedtime.) 90 tablet 3    Semaglutide (RYBELSUS) 14 MG Oral Tab Take 1 tablet by mouth daily. (Patient taking differently: Take 1 tablet by mouth at bedtime.) 90 tablet 3    metFORMIN  MG Oral Tablet 24 Hr Take 2 tablets (1,000 mg total) by mouth daily. (Patient taking differently: Take 2 tablets (1,000 mg total) by mouth at bedtime.) 180 tablet 3    ondansetron 4 MG Oral Tablet Dispersible Take 1 tablet (4 mg total) by mouth every 8 (eight) hours as needed for Nausea. 30 tablet 0    albuterol 108 (90 Base) MCG/ACT Inhalation Aero Soln Inhale 2 puffs into the lungs as needed for Shortness of Breath.      cholecalciferol 25 MCG (1000 UT) Oral Cap Take 1 capsule (1,000 Units total) by mouth daily.      ascorbic acid 500 MG Oral Tab Take 1 tablet (500 mg  total) by mouth every other day.      Budesonide-Formoterol Fumarate 80-4.5 MCG/ACT Inhalation Aerosol Inhale 2 puffs into the lungs as needed.      EPINEPHrine 0.3 MG/0.3ML Injection Solution Auto-injector Inject 0.3 mL (1 each total) into the muscle as needed.      Ferrous Sulfate 325 (65 Fe) MG Oral Tab Take 1 tablet (325 mg total) by mouth daily.      levocetirizine 5 MG Oral Tab Take 1 tablet (5 mg total) by mouth every evening.           Review of Systems:  Review of Systems   Constitutional: Negative.    Respiratory: Negative.     Cardiovascular: Negative.    Gastrointestinal: Negative.    Skin: Negative.    Neurological: Negative.          Objective:   /72   Pulse 81   Ht 5' 5\" (1.651 m)   Wt 195 lb (88.5 kg)   LMP 08/16/2024   SpO2 98%   BMI 32.45 kg/m²  Estimated body mass index is 32.45 kg/m² as calculated from the following:    Height as of this encounter: 5' 5\" (1.651 m).    Weight as of this encounter: 195 lb (88.5 kg).    Wt Readings from Last 6 Encounters:   09/19/24 195 lb (88.5 kg)   08/28/24 197 lb 4.8 oz (89.5 kg)   08/01/24 195 lb (88.5 kg)   06/18/24 206 lb 4 oz (93.6 kg)   05/20/24 210 lb 6 oz (95.4 kg)   03/21/24 213 lb (96.6 kg)       Physical Exam  Vitals and nursing note reviewed.   Constitutional:       General: She is not in acute distress.     Appearance: Normal appearance.   HENT:      Head: Normocephalic and atraumatic.   Eyes:      General:         Right eye: No discharge.         Left eye: No discharge.      Comments: Extraocular eye movements grossly intact   Pulmonary:      Effort: Pulmonary effort is normal.   Musculoskeletal:      Comments: Normal gait   Skin:     Findings: No rash.   Neurological:      General: No focal deficit present.      Mental Status: She is alert. Mental status is at baseline.   Psychiatric:         Mood and Affect: Mood normal.         Behavior: Behavior normal.           Assessment & Plan:   1. Class 2 severe obesity with serious comorbidity  and body mass index (BMI) of 35.0 to 35.9 in adult, unspecified obesity type (HCC) (Primary)  Phentermine 15 mg remains effective. Patient not interested in dose increase at this time. Patient may follow up prn at this point given no effects on blood pressure at this dose. If does desire dose increase in the future may message via PeopleAdmin and is aware to make a 3 month follow up appointment if dose increased    Return in about 8 months (around 5/19/2025), or if symptoms worsen or fail to improve, for Wellness.    Shilpa Corona MD, 9/19/2024, 9:43 AM

## 2024-11-08 DIAGNOSIS — E66.01 CLASS 2 SEVERE OBESITY WITH SERIOUS COMORBIDITY AND BODY MASS INDEX (BMI) OF 35.0 TO 35.9 IN ADULT, UNSPECIFIED OBESITY TYPE (HCC): ICD-10-CM

## 2024-11-08 DIAGNOSIS — E66.812 CLASS 2 SEVERE OBESITY WITH SERIOUS COMORBIDITY AND BODY MASS INDEX (BMI) OF 35.0 TO 35.9 IN ADULT, UNSPECIFIED OBESITY TYPE (HCC): ICD-10-CM

## 2024-11-09 DIAGNOSIS — E66.01 CLASS 2 SEVERE OBESITY WITH SERIOUS COMORBIDITY AND BODY MASS INDEX (BMI) OF 35.0 TO 35.9 IN ADULT, UNSPECIFIED OBESITY TYPE (HCC): ICD-10-CM

## 2024-11-09 DIAGNOSIS — E66.812 CLASS 2 SEVERE OBESITY WITH SERIOUS COMORBIDITY AND BODY MASS INDEX (BMI) OF 35.0 TO 35.9 IN ADULT, UNSPECIFIED OBESITY TYPE (HCC): ICD-10-CM

## 2024-11-13 DIAGNOSIS — E66.01 CLASS 2 SEVERE OBESITY WITH SERIOUS COMORBIDITY AND BODY MASS INDEX (BMI) OF 35.0 TO 35.9 IN ADULT, UNSPECIFIED OBESITY TYPE (HCC): ICD-10-CM

## 2024-11-13 DIAGNOSIS — E66.812 CLASS 2 SEVERE OBESITY WITH SERIOUS COMORBIDITY AND BODY MASS INDEX (BMI) OF 35.0 TO 35.9 IN ADULT, UNSPECIFIED OBESITY TYPE (HCC): ICD-10-CM

## 2024-11-13 RX ORDER — PHENTERMINE HYDROCHLORIDE 15 MG/1
15 CAPSULE ORAL EVERY MORNING
Qty: 30 CAPSULE | Refills: 3 | OUTPATIENT
Start: 2024-11-13

## 2024-11-13 RX ORDER — PHENTERMINE HYDROCHLORIDE 15 MG/1
15 CAPSULE ORAL EVERY MORNING
Qty: 30 CAPSULE | Refills: 3 | Status: SHIPPED | OUTPATIENT
Start: 2024-11-13

## 2024-11-13 NOTE — TELEPHONE ENCOUNTER
Please review. Protocol Failed; No Protocol      Recent fills: 7/20/2024, 9/9/2024, 10/9/2024  Last Rx written: 6/18/2024  Last office visit: 9/19/2024          Requested Prescriptions   Pending Prescriptions Disp Refills    Phentermine HCl 15 MG Oral Cap 30 capsule 3     Sig: Take 1 capsule (15 mg total) by mouth every morning.       Controlled Substance Medication Failed - 11/13/2024  9:50 AM        Failed - This medication is a controlled substance - forward to provider to refill                 Recent Outpatient Visits              1 month ago Class 2 severe obesity with serious comorbidity and body mass index (BMI) of 35.0 to 35.9 in adult, unspecified obesity type (HCC)    [facility] Shilpa Corona MD    Office Visit    2 months ago Postop check    [facility] Sierra Mackenzie MD    Office Visit    4 months ago Class 2 severe obesity with serious comorbidity and body mass index (BMI) of 35.0 to 35.9 in adult, unspecified obesity type (HCC)    [facility] Shilpa Corona MD    Office Visit    5 months ago Encounter for general adult medical examination w/o abnormal findings    [facility] Shilpa Corona MD    Office Visit    7 months ago Abnormal uterine bleeding    [facility] Sierra Mackenzie MD    Office Visit

## 2024-11-18 RX ORDER — PHENTERMINE HYDROCHLORIDE 15 MG/1
15 CAPSULE ORAL EVERY MORNING
Qty: 30 CAPSULE | Refills: 3 | OUTPATIENT
Start: 2024-11-18

## 2024-12-04 ENCOUNTER — OFFICE VISIT (OUTPATIENT)
Dept: PODIATRY CLINIC | Facility: CLINIC | Age: 24
End: 2024-12-04
Payer: COMMERCIAL

## 2024-12-04 DIAGNOSIS — M20.12 HALLUX VALGUS, LEFT: Primary | ICD-10-CM

## 2024-12-04 DIAGNOSIS — L60.0 INGROWN TOENAIL OF BOTH FEET: ICD-10-CM

## 2024-12-04 DIAGNOSIS — M20.11 HALLUX VALGUS, RIGHT: ICD-10-CM

## 2024-12-04 DIAGNOSIS — G57.62 MORTON NEUROMA, LEFT: ICD-10-CM

## 2024-12-04 PROCEDURE — 99203 OFFICE O/P NEW LOW 30 MIN: CPT | Performed by: STUDENT IN AN ORGANIZED HEALTH CARE EDUCATION/TRAINING PROGRAM

## 2024-12-09 RX ORDER — EPINEPHRINE 0.3 MG/.3ML
0.3 INJECTION SUBCUTANEOUS AS NEEDED
Qty: 2 EACH | Refills: 1 | Status: SHIPPED | OUTPATIENT
Start: 2024-12-09

## 2024-12-09 NOTE — TELEPHONE ENCOUNTER
Please review. Protocol Failed; No Protocol    Previous pen .    Requested Prescriptions   Pending Prescriptions Disp Refills    EPINEPHrine 0.3 MG/0.3ML Injection Solution Auto-injector  0     Sig: Inject 0.3 mL (1 each total) into the muscle as needed.       There is no refill protocol information for this order            Future Appointments         Provider Department Appt Notes    In 1 month Edmond Tucker DPM [facility]           Recent Outpatient Visits              5 days ago     [facility] Edmond Tucker DPM    Office Visit    2 months ago Class 2 severe obesity with serious comorbidity and body mass index (BMI) of 35.0 to 35.9 in adult, unspecified obesity type (HCC)    [facility] Shilpa Corona MD    Office Visit    3 months ago Postop check    [facility] Sierra Mackenzie MD    Office Visit    5 months ago Class 2 severe obesity with serious comorbidity and body mass index (BMI) of 35.0 to 35.9 in adult, unspecified obesity type (HCC)    [facility] Shilpa Daniels MD    Office Visit    6 months ago Encounter for general adult medical examination w/o abnormal findings    [facility] Shilpa Daniels MD    Office Visit

## 2024-12-10 NOTE — PROGRESS NOTES
[facility]  Progress Note    Michael Hoffman is a 24 year old female.   Chief Complaint   Patient presents with    Bunions     B/l foot - Pt states pain when walking and standing. Left is worse .    Ingrown Toenail     Pt states most of her toe nails are ingrown but big toes are worse. L big has some discharge.     Neuroma         HPI:     Patient is a very pleasant 24-year-old female presents to clinic for evaluation of multiple pedal complaints.  She does have bunion deformity both feet with the left worse than the right.  She does get some pain when walking or standing.  She complains of pain with the prominence on the medial aspect of her first metatarsal head.  Denies any recent trauma or injury.  She also complains of ingrown nails to both borders of both the right and left hallux nails.  Lastly, she does complain of some occasional neuroma symptoms to her left foot.  She presents today for evaluation and to discuss treatment options.  No other complaints are mentioned.  Past medical history, medications, and allergies reviewed.      Allergies: Shellfish allergy, Shrimp, and Seasonal   Current Outpatient Medications   Medication Sig Dispense Refill    EPINEPHrine 0.3 MG/0.3ML Injection Solution Auto-injector Inject 0.3 mL (1 each total) into the muscle as needed. 2 each 1    Phentermine HCl 15 MG Oral Cap Take 1 capsule (15 mg total) by mouth every morning. 30 capsule 3    Levonorgestrel (MIRENA, 52 MG,) 20 MCG/DAY Intrauterine IUD 20 mcg (1 each total) by Intrauterine route one time.      escitalopram (LEXAPRO) 10 MG Oral Tab Take 1 tablet (10 mg total) by mouth daily. (Patient taking differently: Take 1 tablet (10 mg total) by mouth at bedtime.) 90 tablet 3    escitalopram 5 MG Oral Tab Take 1 tablet (5 mg total) by mouth daily. (Patient taking differently: Take 1 tablet (5 mg total) by mouth at bedtime.) 90 tablet 3    Semaglutide (RYBELSUS) 14 MG Oral Tab Take 1 tablet by mouth daily.  (Patient taking differently: Take 1 tablet by mouth at bedtime.) 90 tablet 3    metFORMIN  MG Oral Tablet 24 Hr Take 2 tablets (1,000 mg total) by mouth daily. (Patient taking differently: Take 2 tablets (1,000 mg total) by mouth at bedtime.) 180 tablet 3    ondansetron 4 MG Oral Tablet Dispersible Take 1 tablet (4 mg total) by mouth every 8 (eight) hours as needed for Nausea. 30 tablet 0    albuterol 108 (90 Base) MCG/ACT Inhalation Aero Soln Inhale 2 puffs into the lungs as needed for Shortness of Breath.      cholecalciferol 25 MCG (1000 UT) Oral Cap Take 1 capsule (1,000 Units total) by mouth daily.      ascorbic acid 500 MG Oral Tab Take 1 tablet (500 mg total) by mouth every other day.      Budesonide-Formoterol Fumarate 80-4.5 MCG/ACT Inhalation Aerosol Inhale 2 puffs into the lungs as needed.      Ferrous Sulfate 325 (65 Fe) MG Oral Tab Take 1 tablet (325 mg total) by mouth daily.      levocetirizine 5 MG Oral Tab Take 1 tablet (5 mg total) by mouth every evening.        Past Medical History:    Anesthesia complication    HYSTERICAL AFTER KETAMINE FOR WISDOM TEETH    Anxiety    Asthma (HCC)    Environmental allergies    Esophageal reflux    Irregular periods    Obesity    PCOS (polycystic ovarian syndrome)      Past Surgical History:   Procedure Laterality Date    Hysteroscopy,with sampling  08/16/2024    [place] teeth removed        Family History   Problem Relation Age of Onset    Thyroid disease Mother     Hypertension Mother     No Known Problems Father     No Known Problems Brother     No Known Problems Brother     Pancreatic Cancer Maternal Grandfather     Cancer Maternal Grandfather         Pancreatic cancer    Diabetes Paternal Grandfather     Substance Abuse Paternal Grandfather     Breast Cancer Neg     Ovarian Cancer Neg     Colon Cancer Neg     Uterine Cancer Neg       Social History     Socioeconomic History    Marital status: Single   Occupational History    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       EXAM:   LMP 08/16/2024   GENERAL: well developed, well nourished, in no apparent distress  EXTREMITIES:   1. Integument: Normal skin temperature and turgor. Incurvated nails to medial and lateral borders of both hallux nails.  2. Vascular: Dorsalis pedis two out of four bilateral and posterior tibial pulses two out of   four bilateral, capillary refill normal.   3. Musculoskeletal: All muscle groups are graded 5 out of 5 in the foot and ankle. Medial deviation of first metatarsal and lateral deviation of hallux consistent with moderate bunion deformity, L>R. No current pain to site of neuroma.   4. Neurological: Normal sharp dull sensation; reflexes normal.        ASSESSMENT AND PLAN:   Diagnoses and all orders for this visit:    Hallux valgus, left  -     [facility] AMB POD XR - LT FOOT 2 VIEWS(AP, LATERAL)WT BEARING    Hallux valgus, right  -     [facility] AMB POD XR - RT FOOT 2 VIEWS(AP, LATERAL)WT BEARING    Ingrown toenail of both feet    Fernandez neuroma, left        Plan:    -Patient examined, chart history reviewed.  -Discussed etiology of condition and various treatment options.  -X-rays obtained and reviewed.  Bunion deformity noted bilaterally at the left worse than the right.  -Discussed conservative and surgical treatment options.  Conservatively discussed importance of supportive shoes and inserts.  Informational handout dispensed recommend shoes and inserts.    -Surgically, would likely recommend Lapidus bunionectomy.  This would provide correction to long-term results for patient.  Discussed  that pain and function should be guide for pursuing surgery.  -For ingrown nails, can perform matrixectomy procedure.  Patient is interested in this for both medial and lateral borders of both hallux nails.  She will schedule this procedure.  Briefly discussed including benefits, risks, recovery period.  -Neuroma is asymptomatic at this time.  If symptoms worsen or fail to improve could consider cortisone injection.    All questions answered to satisfaction.  Appreciate the opportunity dissipate patient's care.  She will hold off on bunion surgery for now but will return for nail procedure at her convenience.    The patient indicates understanding of these issues and agrees to the plan.        Edmond Tucker DPM    Dragon speech recognition software was used to prepare this note.  Errors in word recognition may occur.  Please contact me with any questions/concerns with this note.          Ingrown nails--schedule matrixectomy procedure to all 4 borders  Bunions  neuroma

## 2024-12-13 ENCOUNTER — TELEPHONE (OUTPATIENT)
Dept: OBGYN CLINIC | Facility: CLINIC | Age: 24
End: 2024-12-13

## 2024-12-13 NOTE — TELEPHONE ENCOUNTER
Rose Marie from [facility] called to verify the number of IUDs pt had inserted. RN told Rose Marie that 1 Mirena IUD was placed in the OR under anesthesia. RN was unable to answer other billing questions, so RN provided number for Billing Dept.

## 2024-12-16 ENCOUNTER — TELEPHONE (OUTPATIENT)
Facility: CLINIC | Age: 24
End: 2024-12-16

## 2024-12-17 NOTE — TELEPHONE ENCOUNTER
Approved    Prior authorization approved  Payer: [facility] [place] Case ID: [id number]    [phone]    [phone]  Note from payer: Your PA request has been approved.  Additional information will be provided in the approval communication. (Message 1148)  Approval Details    Authorized from November 17, 2024 to December 17, 2025      Patient notified via [employer].

## 2024-12-18 ENCOUNTER — TELEPHONE (OUTPATIENT)
Dept: PODIATRY CLINIC | Facility: CLINIC | Age: 24
End: 2024-12-18

## 2024-12-18 NOTE — TELEPHONE ENCOUNTER
Called patient and rescheduled ingrown toenail procedure for 2/18 at 345pm with Dr Tucker. She had no further concerns.

## 2024-12-18 NOTE — TELEPHONE ENCOUNTER
Patient calling to reschedule her procedure  1/24/25  would like to know if 2/17/25 is available for procedure? Please advise

## 2025-01-21 ENCOUNTER — TELEPHONE (OUTPATIENT)
Facility: CLINIC | Age: 25
End: 2025-01-21

## 2025-01-21 DIAGNOSIS — E28.2 PCOS (POLYCYSTIC OVARIAN SYNDROME): ICD-10-CM

## 2025-01-21 DIAGNOSIS — E66.812 CLASS 2 SEVERE OBESITY WITH SERIOUS COMORBIDITY AND BODY MASS INDEX (BMI) OF 35.0 TO 35.9 IN ADULT, UNSPECIFIED OBESITY TYPE (HCC): ICD-10-CM

## 2025-01-21 DIAGNOSIS — E66.01 CLASS 2 SEVERE OBESITY WITH SERIOUS COMORBIDITY AND BODY MASS INDEX (BMI) OF 35.0 TO 35.9 IN ADULT, UNSPECIFIED OBESITY TYPE (HCC): ICD-10-CM

## 2025-01-21 NOTE — TELEPHONE ENCOUNTER
Spoke with Nellie at [facility], patient's prior auth for Rebelsus has  with the new year.  Needs new PA.  Routed to Triage Support, please assist.        Michael Hoffman to P Em Rn Triage (supporting Shilpa Corona MD)   BIBI      25  8:24 AM  Hello! [facility] should be sending you something about my Rybelsus needing insurance approval? I take my last pill on Thursday and I go out of town on Friday, so I would really appreciate if I could  my medication before Friday afternoon. Thank you so much!

## 2025-01-22 NOTE — TELEPHONE ENCOUNTER
Denied    Note from payer: Your PA request has been denied.  Additional information will be provided in the denial communication. (Message 1147)  Payer: [facility] [place] Case ID: [id number]    [phone]  Electronic appeal: Not supported  Appeal instructions: Your PA request has been denied.  Additional information will be provided in the denial communication. (Message 1147)  View History

## 2025-02-18 ENCOUNTER — OFFICE VISIT (OUTPATIENT)
Dept: PODIATRY CLINIC | Facility: CLINIC | Age: 25
End: 2025-02-18
Payer: COMMERCIAL

## 2025-02-18 DIAGNOSIS — M20.11 HALLUX VALGUS, RIGHT: ICD-10-CM

## 2025-02-18 DIAGNOSIS — M20.12 HALLUX VALGUS, LEFT: ICD-10-CM

## 2025-02-18 DIAGNOSIS — L60.0 INGROWN TOENAIL OF BOTH FEET: Primary | ICD-10-CM

## 2025-02-18 DIAGNOSIS — F41.9 ANXIETY: ICD-10-CM

## 2025-02-18 PROCEDURE — 3074F SYST BP LT 130 MM HG: CPT | Performed by: STUDENT IN AN ORGANIZED HEALTH CARE EDUCATION/TRAINING PROGRAM

## 2025-02-18 PROCEDURE — 99214 OFFICE O/P EST MOD 30 MIN: CPT | Performed by: STUDENT IN AN ORGANIZED HEALTH CARE EDUCATION/TRAINING PROGRAM

## 2025-02-18 PROCEDURE — 3078F DIAST BP <80 MM HG: CPT | Performed by: STUDENT IN AN ORGANIZED HEALTH CARE EDUCATION/TRAINING PROGRAM

## 2025-02-18 RX ORDER — CEPHALEXIN 500 MG/1
500 CAPSULE ORAL 2 TIMES DAILY
Qty: 14 CAPSULE | Refills: 0 | Status: SHIPPED | OUTPATIENT
Start: 2025-02-18

## 2025-02-19 VITALS — DIASTOLIC BLOOD PRESSURE: 74 MMHG | SYSTOLIC BLOOD PRESSURE: 128 MMHG

## 2025-02-19 NOTE — PROGRESS NOTES
Per Dr block to draw up .2.5ml of 1% Lidocaine and 2.5ml marcaine 0.5%  X2 for a right and  left hallux Ingrown  Toenail Procedure.

## 2025-02-23 NOTE — PROGRESS NOTES
[facility]  Progress Note    Michael Hoffman is a 24 year old female.   Chief Complaint   Patient presents with    Procedure     Matrixectomy bilateral hallux- denies pain         HPI:     Patient is a very pleasant 24-year-old female presents to clinic for follow-up of painful ingrown nails to both borders of both hallux nails.  She is interested in permanent matrixectomy procedures to these sites.  No other complaints are mentioned.  Past medical history, medications, allergies were reviewed.       Allergies: Shellfish allergy, Shrimp, and Seasonal   Current Outpatient Medications   Medication Sig Dispense Refill    cephALEXin 500 MG Oral Cap Take 1 capsule (500 mg total) by mouth 2 (two) times daily. 14 capsule 0    EPINEPHrine 0.3 MG/0.3ML Injection Solution Auto-injector Inject 0.3 mL (1 each total) into the muscle as needed. 2 each 1    Phentermine HCl 15 MG Oral Cap Take 1 capsule (15 mg total) by mouth every morning. 30 capsule 3    Levonorgestrel (MIRENA, 52 MG,) 20 MCG/DAY Intrauterine IUD 20 mcg (1 each total) by Intrauterine route one time.      escitalopram (LEXAPRO) 10 MG Oral Tab Take 1 tablet (10 mg total) by mouth daily. (Patient taking differently: Take 1 tablet (10 mg total) by mouth at bedtime.) 90 tablet 3    escitalopram 5 MG Oral Tab Take 1 tablet (5 mg total) by mouth daily. (Patient taking differently: Take 1 tablet (5 mg total) by mouth at bedtime.) 90 tablet 3    metFORMIN  MG Oral Tablet 24 Hr Take 2 tablets (1,000 mg total) by mouth daily. (Patient taking differently: Take 2 tablets (1,000 mg total) by mouth at bedtime.) 180 tablet 3    ondansetron 4 MG Oral Tablet Dispersible Take 1 tablet (4 mg total) by mouth every 8 (eight) hours as needed for Nausea. 30 tablet 0    albuterol 108 (90 Base) MCG/ACT Inhalation Aero Soln Inhale 2 puffs into the lungs as needed for Shortness of Breath.      cholecalciferol 25 MCG (1000 UT) Oral Cap Take 1 capsule (1,000 Units  total) by mouth daily.      ascorbic acid 500 MG Oral Tab Take 1 tablet (500 mg total) by mouth every other day.      Budesonide-Formoterol Fumarate 80-4.5 MCG/ACT Inhalation Aerosol Inhale 2 puffs into the lungs as needed.      Ferrous Sulfate 325 (65 Fe) MG Oral Tab Take 1 tablet (325 mg total) by mouth daily.      levocetirizine 5 MG Oral Tab Take 1 tablet (5 mg total) by mouth every evening.      Semaglutide (RYBELSUS) 14 MG Oral Tab Take 1 tablet by mouth daily. (Patient taking differently: Take 1 tablet by mouth at bedtime.) 90 tablet 3      Past Medical History:    Anesthesia complication    HYSTERICAL AFTER KETAMINE FOR WISDOM TEETH    Anxiety    Asthma (HCC)    Environmental allergies    Esophageal reflux    Irregular periods    Obesity    PCOS (polycystic ovarian syndrome)      Past Surgical History:   Procedure Laterality Date    Hysteroscopy,with sampling  08/16/2024    [place] teeth removed        Family History   Problem Relation Age of Onset    Thyroid disease Mother     Hypertension Mother     No Known Problems Father     No Known Problems Brother     No Known Problems Brother     Pancreatic Cancer Maternal Grandfather     Cancer Maternal Grandfather         Pancreatic cancer    Diabetes Paternal Grandfather     Substance Abuse Paternal Grandfather     Breast Cancer Neg     Ovarian Cancer Neg     Colon Cancer Neg     Uterine Cancer Neg       Social History     Socioeconomic History    Marital status: Single   Occupational History    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       EXAM:   /74  (BP Location: Right arm, Patient Position: Sitting, Cuff Size: adult)   LMP 08/16/2024   GENERAL: well developed, well nourished, in no apparent distress  EXTREMITIES:   1. Integument: Normal skin temperature and turgor. Incurvated nails to medial and lateral borders of both hallux nails.  2. Vascular: Dorsalis pedis two out of four bilateral and posterior tibial pulses two out of   four bilateral, capillary refill normal.   3. Musculoskeletal: All muscle groups are graded 5 out of 5 in the foot and ankle. Medial deviation of first metatarsal and lateral deviation of hallux consistent with moderate bunion deformity, L>R. No current pain to site of neuroma.   4. Neurological: Normal sharp dull sensation; reflexes normal.        ASSESSMENT AND PLAN:   Diagnoses and all orders for this visit:    Ingrown toenail of both feet  -     cephALEXin 500 MG Oral Cap; Take 1 capsule (500 mg total) by mouth 2 (two) times daily.    Hallux valgus, right    Hallux valgus, left        Plan:    -Patient examined, chart history reviewed.  -Discussed etiology of patient's condition and various treatment options.  -Patient is interested in matrixectomy procedure to both borders of hallux nails given chronically ingrown nails to sites.  Procedure discussed including benefits, risks, recovery period.  Written consent was obtained.    Procedure as follows:  After prepping site with alcohol, administered local infiltrative block to right and left hallux consisting of 5 cc of 1:1 mixture of 0.5% Marcaine plain and 1% lidocaine plain.  After sufficient anesthesia was achieved, the digits were prepped with alcohol.  Next, using a hemostat, the medial and lateral borders of the hallux nails were freed.  An English anvil was then used to cut the incurvated portions of the nails down to the nail matrix.  A hemostat was once again used to remove the incurvated portions of the nails in their entirety.  Next, digital tourniquets were applied to both  the right and left hallux nails.  The matrixectomy sites were thoroughly curetted with a dermal curette.  Surrounding skin was prepped with bacitracin.  3 x 30-second applications of 89% phenol were applied to the corners per standard technique.  The sites were then copiously irrigated with saline and patted dry.  The site was dressed with bacitracin, gauze, and mildly compressive Coban wrap.  The digital tourniquets were removed and prompt hyperemic response was noted to the digits.  The patient tolerated the procedure well and there were no complications.      -All soaking and aftercare instructions were discussed with the patient.  Informational handout was dispensed.  -Rx keflex.  -Educated patient on acute signs of infection advised patient to seek immediate medical attention if any concerns arise.     The patient indicates understanding of these issues and agrees to the plan.    I spent 30 minutes with the patient. This time included:    preparing to see the patient (eg, review notes and recent diagnostics),  seeing the patient, obtaining and/or reviewing separately obtained history, performing a medically appropriate examination and/or evaluation, counseling and educating the patient, documenting in the record.     DAVID Barrera speech recognition software was used to prepare this note.  Errors in word recognition may occur.  Please contact me with any questions/concerns with this note.

## 2025-02-24 ENCOUNTER — OFFICE VISIT (OUTPATIENT)
Dept: PODIATRY CLINIC | Facility: CLINIC | Age: 25
End: 2025-02-24
Payer: COMMERCIAL

## 2025-02-24 DIAGNOSIS — L60.0 INGROWN TOENAIL OF BOTH FEET: Primary | ICD-10-CM

## 2025-02-24 PROCEDURE — 99212 OFFICE O/P EST SF 10 MIN: CPT | Performed by: STUDENT IN AN ORGANIZED HEALTH CARE EDUCATION/TRAINING PROGRAM

## 2025-02-24 RX ORDER — ESCITALOPRAM OXALATE 5 MG/1
5 TABLET ORAL DAILY
Qty: 90 TABLET | Refills: 3 | Status: SHIPPED | OUTPATIENT
Start: 2025-02-24

## 2025-02-24 NOTE — PROGRESS NOTES
[facility]  Progress Note    Michael Hoffman is a 24 year old female.   Chief Complaint   Patient presents with    Follow - Up     Bilateral Hallux- patient denies pain- tender- a little bit of drainage          HPI:     Patient is a very pleasant 24-year-old female who presents to clinic for follow-up status post nail matrixectomy medial lateral borders of right and left hallux nails.  She has performed soaking and aftercare as advised.  She relates that site seems to be healing well.  She does have a little bit of tenderness and drainage to sites.  No other complaints are mentioned.  Past medical history, medications, allergies reviewed.      Allergies: Shellfish allergy, Shrimp, and Seasonal   Current Outpatient Medications   Medication Sig Dispense Refill    escitalopram 5 MG Oral Tab Take 1 tablet (5 mg total) by mouth daily. 90 tablet 3    cephALEXin 500 MG Oral Cap Take 1 capsule (500 mg total) by mouth 2 (two) times daily. 14 capsule 0    EPINEPHrine 0.3 MG/0.3ML Injection Solution Auto-injector Inject 0.3 mL (1 each total) into the muscle as needed. 2 each 1    Phentermine HCl 15 MG Oral Cap Take 1 capsule (15 mg total) by mouth every morning. 30 capsule 3    Levonorgestrel (MIRENA, 52 MG,) 20 MCG/DAY Intrauterine IUD 20 mcg (1 each total) by Intrauterine route one time.      escitalopram (LEXAPRO) 10 MG Oral Tab Take 1 tablet (10 mg total) by mouth daily. (Patient taking differently: Take 1 tablet (10 mg total) by mouth at bedtime.) 90 tablet 3    metFORMIN  MG Oral Tablet 24 Hr Take 2 tablets (1,000 mg total) by mouth daily. (Patient taking differently: Take 2 tablets (1,000 mg total) by mouth at bedtime.) 180 tablet 3    ondansetron 4 MG Oral Tablet Dispersible Take 1 tablet (4 mg total) by mouth every 8 (eight) hours as needed for Nausea. 30 tablet 0    albuterol 108 (90 Base) MCG/ACT Inhalation Aero Soln Inhale 2 puffs into the lungs as needed for Shortness of Breath.       cholecalciferol 25 MCG (1000 UT) Oral Cap Take 1 capsule (1,000 Units total) by mouth daily.      ascorbic acid 500 MG Oral Tab Take 1 tablet (500 mg total) by mouth every other day.      Budesonide-Formoterol Fumarate 80-4.5 MCG/ACT Inhalation Aerosol Inhale 2 puffs into the lungs as needed.      Ferrous Sulfate 325 (65 Fe) MG Oral Tab Take 1 tablet (325 mg total) by mouth daily.      levocetirizine 5 MG Oral Tab Take 1 tablet (5 mg total) by mouth every evening.      Semaglutide (RYBELSUS) 14 MG Oral Tab Take 1 tablet by mouth daily. (Patient taking differently: Take 1 tablet by mouth at bedtime.) 90 tablet 3      Past Medical History:    Anesthesia complication    HYSTERICAL AFTER KETAMINE FOR WISDOM TEETH    Anxiety    Asthma (HCC)    Environmental allergies    Esophageal reflux    Irregular periods    Obesity    PCOS (polycystic ovarian syndrome)      Past Surgical History:   Procedure Laterality Date    Hysteroscopy,with sampling  08/16/2024    [place] teeth removed        Family History   Problem Relation Age of Onset    Thyroid disease Mother     Hypertension Mother     No Known Problems Father     No Known Problems Brother     No Known Problems Brother     Pancreatic Cancer Maternal Grandfather     Cancer Maternal Grandfather         Pancreatic cancer    Diabetes Paternal Grandfather     Substance Abuse Paternal Grandfather     Breast Cancer Neg     Ovarian Cancer Neg     Colon Cancer Neg     Uterine Cancer Neg       Social History     Socioeconomic History    Marital status: Single   Occupational History    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       EXAM:   [facility] 08/16/2024   GENERAL: well developed, well nourished, in no apparent distress  EXTREMITIES:   1. Integument: Normal skin temperature and turgor.  Sites of nail matricectomy procedure are healing well with minimal bioburden.  No acute signs of infection or other concerns.  2. Vascular: Dorsalis pedis two out of four bilateral and posterior tibial pulses two out of   four bilateral, capillary refill normal.   3. Musculoskeletal: All muscle groups are graded 5 out of 5 in the foot and ankle. Medial deviation of first metatarsal and lateral deviation of hallux consistent with moderate bunion deformity, L>R. No current pain to site of neuroma.   4. Neurological: Normal sharp dull sensation; reflexes normal.        ASSESSMENT AND PLAN:   Diagnoses and all orders for this visit:    Ingrown toenail of both feet        Plan:    -Patient examined, chart history reviewed.  -Discussed etiology of patient's condition and various treatment options.  -Inspected sites of nail matrixectomy procedures.  Noted to be healing well without concerns at this time.  Minimal bioburden curetted free without incident.  Sites dressed with bacitracin and Band-Aid.  Can continue soaking aftercare for another week or so.  Should follow-up with pain, recurrence, or other concerns arise moving forward.    The patient indicates understanding of these issues and agrees to the plan.    DAVID Barrera speech recognition software was used to prepare this note.  Errors in word recognition may occur.  Please contact me with any questions/concerns with this note.

## 2025-02-24 NOTE — TELEPHONE ENCOUNTER
This medication passed protocol, but there are currently two escitalopram prescriptions.  Please verify with patient what dose she is currently taking.

## 2025-02-24 NOTE — TELEPHONE ENCOUNTER
Refill passed per [facility] protocol.   Requested Prescriptions   Pending Prescriptions Disp Refills    escitalopram 5 MG Oral Tab 90 tablet 3     Sig: Take 1 tablet (5 mg total) by mouth daily.       Psychiatric Non-Scheduled (Anti-Anxiety) Passed - 2/24/2025 11:22 AM        Passed - In person appointment or virtual visit in the past 6 mos or appointment in next 3 mos     Recent Outpatient Visits              6 days ago Ingrown toenail of both feet    [facility] Edmond Tucker DPM    Office Visit    2 months ago Hallux valgus, left    [facility] Edmond Tucker DPM    Office Visit    5 months ago Class 2 severe obesity with serious comorbidity and body mass index (BMI) of 35.0 to 35.9 in adult, unspecified obesity type (HCC)    [facility] Shilpa Corona MD    Office Visit    6 months ago Postop check    [facility] - OB/GYN Sierra Mackenzie MD    Office Visit    8 months ago Class 2 severe obesity with serious comorbidity and body mass index (BMI) of 35.0 to 35.9 in adult, unspecified obesity type (HCC)    [facility] Shilpa Corona MD    Office Visit          Future Appointments         Provider Department Appt Notes    Today Edmond Tucker DPM [facility]     In 2 weeks Shilpa Corona MD [facility] Adipex dosage (noticing increase in appetite since stopping Rybelsus due to insurance)                    Passed - Depression Screening completed within the past 12 months        Passed - Medication is active on med list              Recent Outpatient Visits              6 days ago Ingrown toenail of both feet    [facility] Edmond Tucker  DAVID Dietrich    Office Visit    2 months ago Hallux valgus, left    [facility] Edmond Tucker DPM    Office Visit    5 months ago Class 2 severe obesity with serious comorbidity and body mass index (BMI) of 35.0 to 35.9 in adult, unspecified obesity type (HCC)    [facility] Shilpa Corona MD    Office Visit    6 months ago Postop check    [facility] - OB/GYN Sierra Mackenzie MD    Office Visit    8 months ago Class 2 severe obesity with serious comorbidity and body mass index (BMI) of 35.0 to 35.9 in adult, unspecified obesity type (HCC)    [facility] Shilpa Corona MD    Office Visit            Future Appointments         Provider Department Appt Notes    Today Edmond Tucker DPM [facility]     In 2 weeks Shilpa Corona MD [facility] Adipex dosage (noticing increase in appetite since stopping Rybelsus due to insurance)

## 2025-03-05 DIAGNOSIS — E66.812 CLASS 2 SEVERE OBESITY WITH SERIOUS COMORBIDITY AND BODY MASS INDEX (BMI) OF 35.0 TO 35.9 IN ADULT, UNSPECIFIED OBESITY TYPE (HCC): ICD-10-CM

## 2025-03-05 DIAGNOSIS — E66.01 CLASS 2 SEVERE OBESITY WITH SERIOUS COMORBIDITY AND BODY MASS INDEX (BMI) OF 35.0 TO 35.9 IN ADULT, UNSPECIFIED OBESITY TYPE (HCC): ICD-10-CM

## 2025-03-08 NOTE — TELEPHONE ENCOUNTER
Please review; protocol failed/ has no protocol      Recent fills: 02/10/2025,01/11/2025,12/13/2024  Last Rx written: 11/13/2024  Last Office Visit: 09/19/2024    Recent Visits  Date Type Provider Dept   09/19/24 Office Visit Shilpa Corona MD [facility]     Future Appointments  Date Type Provider Dept   03/27/25 Appointment Shilpa Corona MD [facility]   Showing future appointments within next 150 days with a meds authorizing provider and meeting all other requirements

## 2025-03-11 RX ORDER — PHENTERMINE HYDROCHLORIDE 15 MG/1
15 CAPSULE ORAL EVERY MORNING
Qty: 30 CAPSULE | Refills: 3 | Status: SHIPPED | OUTPATIENT
Start: 2025-03-11

## 2025-03-27 ENCOUNTER — TELEMEDICINE (OUTPATIENT)
Facility: CLINIC | Age: 25
End: 2025-03-27
Payer: COMMERCIAL

## 2025-03-27 DIAGNOSIS — E66.01 CLASS 2 SEVERE OBESITY WITH SERIOUS COMORBIDITY AND BODY MASS INDEX (BMI) OF 35.0 TO 35.9 IN ADULT, UNSPECIFIED OBESITY TYPE (HCC): Primary | ICD-10-CM

## 2025-03-27 DIAGNOSIS — E28.2 PCOS (POLYCYSTIC OVARIAN SYNDROME): ICD-10-CM

## 2025-03-27 DIAGNOSIS — E78.2 MIXED HYPERLIPIDEMIA: ICD-10-CM

## 2025-03-27 DIAGNOSIS — E66.812 CLASS 2 SEVERE OBESITY WITH SERIOUS COMORBIDITY AND BODY MASS INDEX (BMI) OF 35.0 TO 35.9 IN ADULT, UNSPECIFIED OBESITY TYPE (HCC): Primary | ICD-10-CM

## 2025-03-27 PROCEDURE — 98005 SYNCH AUDIO-VIDEO EST LOW 20: CPT | Performed by: FAMILY MEDICINE

## 2025-03-27 RX ORDER — METFORMIN HYDROCHLORIDE 500 MG/1
TABLET, EXTENDED RELEASE ORAL
Qty: 270 TABLET | Refills: 3 | Status: SHIPPED | OUTPATIENT
Start: 2025-03-27

## 2025-03-27 NOTE — PROGRESS NOTES
CC:    Chief Complaint   Patient presents with    Medication Follow-Up       HPI: Patient presenting for video visit. Patient is working on diet and lifestyle. Insurance has recently denied rybelsus. Prior to starting rybelsus patient notes shaky feeling. When started rybelsus this resolved. Is wondering if increasing metformin dose could be helpful with this sensation    Patient is also wondering if we can get repeat lipid panel to follow hyperlipidemia.    ROS:  General:  No fever, no fatigue, no weight changes  HEENT:  Denies congestion or nasal discharge  Cardio:  No chest pain  Pulmonary:  No cough, no SOB  GI:  No N/V/D  :  No discharge, no dysuria, no polyuria, no hematuria  Dermatologic:  No rashes    Past Medical History:    Anesthesia complication    HYSTERICAL AFTER KETAMINE FOR WISDOM TEETH    Anxiety    Asthma (HCC)    Environmental allergies    Esophageal reflux    Irregular periods    Obesity    PCOS (polycystic ovarian syndrome)       Social History     Socioeconomic History    Marital status: Single     Spouse name: Not on file    Number of children: Not on file    Years of education: Not on file    Highest education level: Not on file   Occupational History    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     Comment: plans to apply to med school   Tobacco Use    Smoking status: Never    Smokeless tobacco: Never   Vaping Use    Vaping status: Never Used   Substance and Sexual Activity    Alcohol use: Never    Drug use: Never    Sexual activity: Yes     Partners: Female     Birth control/protection: OCP   Other Topics Concern     Service Not Asked    Blood Transfusions No    Caffeine Concern Not Asked    Occupational Exposure Not Asked    Hobby Hazards Not Asked    Sleep Concern Not Asked    Stress Concern Not Asked    Weight Concern Not Asked    Special Diet Not Asked    Back Care Not Asked    Exercise Not Asked    Bike Helmet Not Asked    Seat Belt Not Asked    Self-Exams Not  Asked   Social History Narrative    Lives with partner Tories    No abuse     Social Drivers of Health     Food Insecurity: Not on file   Transportation Needs: Not on file   Stress: Not on file   Housing Stability: Not on file       Current Outpatient Medications   Medication Sig Dispense Refill    Phentermine HCl 15 MG Oral Cap Take 1 capsule (15 mg total) by mouth every morning. 30 capsule 3    escitalopram 5 MG Oral Tab Take 1 tablet (5 mg total) by mouth daily. 90 tablet 3    EPINEPHrine 0.3 MG/0.3ML Injection Solution Auto-injector Inject 0.3 mL (1 each total) into the muscle as needed. 2 each 1    Levonorgestrel (MIRENA, 52 MG,) 20 MCG/DAY Intrauterine IUD 20 mcg (1 each total) by Intrauterine route one time.      escitalopram (LEXAPRO) 10 MG Oral Tab Take 1 tablet (10 mg total) by mouth daily. 90 tablet 3    metFORMIN  MG Oral Tablet 24 Hr Take 2 tablets (1,000 mg total) by mouth daily. 180 tablet 3    ondansetron 4 MG Oral Tablet Dispersible Take 1 tablet (4 mg total) by mouth every 8 (eight) hours as needed for Nausea. 30 tablet 0    albuterol 108 (90 Base) MCG/ACT Inhalation Aero Soln Inhale 2 puffs into the lungs as needed for Shortness of Breath.      ascorbic acid 500 MG Oral Tab Take 1 tablet (500 mg total) by mouth every other day.      Budesonide-Formoterol Fumarate 80-4.5 MCG/ACT Inhalation Aerosol Inhale 2 puffs into the lungs as needed.      Ferrous Sulfate 325 (65 Fe) MG Oral Tab Take 1 tablet (325 mg total) by mouth daily.      levocetirizine 5 MG Oral Tab Take 1 tablet (5 mg total) by mouth every evening.      cholecalciferol 25 MCG (1000 UT) Oral Cap Take 1 capsule (1,000 Units total) by mouth daily.         Shellfish allergy, Shrimp, and Seasonal      Vitals: There were no vitals filed for this visit.      Physical:  General:  Alert, appropriate, no acute distress, A&O x 3  Pulmonary:  Speaking in clear and full sentences, no dyspnea   Psych: normal mood and affect     Assessment and  Plan:     1. Class 2 severe obesity with serious comorbidity and body mass index (BMI) of 35.0 to 35.9 in adult, unspecified obesity type (HCC)  2. PCOS (polycystic ovarian syndrome)    - metFORMIN  MG Oral Tablet 24 Hr; Take two tablets by mouth in the AM and then one table by mouth in PM  Dispense: 270 tablet; Refill: 3    Will trial increasing metformin to see if this is helpful with patient's weight loss journey.  Did briefly discuss potential dose increase of phentermine but patient would like to hold off for now.    3. Mixed hyperlipidemia  - Lipid Panel; Future    Ordered updated lipid panel    Follow-up 2 to 3 months for annual physical        Please note that the following visit was completed using two-way, real-time interactive audio and video communication. This has been done in good sheela to provide continuity of care in the best interest of the provider-patient relationship, due to the ongoing public health crisis/national emergency and because of restrictions of visitation. There are limitations of this visit as no physical exam could be performed. Every conscious effort was taken to allow for sufficient and adequate time. This billing was spent on reviewing labs, medications, radiology tests and decision making. Appropriate medical decision-making and tests are ordered as detailed in the plan of care above.      Shilpa Corona MD  03/27/25  11:01 AM

## 2025-04-21 ENCOUNTER — LAB ENCOUNTER (OUTPATIENT)
Dept: LAB | Age: 25
End: 2025-04-21
Attending: FAMILY MEDICINE
Payer: COMMERCIAL

## 2025-04-21 DIAGNOSIS — E78.2 MIXED HYPERLIPIDEMIA: ICD-10-CM

## 2025-04-21 LAB
CHOLEST SERPL-MCNC: 230 MG/DL (ref ?–200)
FASTING PATIENT LIPID ANSWER: YES
HDLC SERPL-MCNC: 55 MG/DL (ref 40–59)
LDLC SERPL CALC-MCNC: 150 MG/DL (ref ?–100)
NONHDLC SERPL-MCNC: 175 MG/DL (ref ?–130)
TRIGL SERPL-MCNC: 142 MG/DL (ref 30–149)
VLDLC SERPL CALC-MCNC: 27 MG/DL (ref 0–30)

## 2025-04-21 PROCEDURE — 80061 LIPID PANEL: CPT | Performed by: FAMILY MEDICINE

## 2025-05-23 DIAGNOSIS — E28.2 PCOS (POLYCYSTIC OVARIAN SYNDROME): ICD-10-CM

## 2025-05-23 DIAGNOSIS — E66.812 CLASS 2 SEVERE OBESITY WITH SERIOUS COMORBIDITY AND BODY MASS INDEX (BMI) OF 35.0 TO 35.9 IN ADULT, UNSPECIFIED OBESITY TYPE (HCC): ICD-10-CM

## 2025-05-23 DIAGNOSIS — E66.01 CLASS 2 SEVERE OBESITY WITH SERIOUS COMORBIDITY AND BODY MASS INDEX (BMI) OF 35.0 TO 35.9 IN ADULT, UNSPECIFIED OBESITY TYPE (HCC): ICD-10-CM

## 2025-05-23 DIAGNOSIS — R11.0 NAUSEA: ICD-10-CM

## 2025-05-23 DIAGNOSIS — F41.9 ANXIETY: ICD-10-CM

## 2025-05-23 RX ORDER — ONDANSETRON 4 MG/1
4 TABLET, ORALLY DISINTEGRATING ORAL EVERY 8 HOURS PRN
Qty: 30 TABLET | Refills: 0 | OUTPATIENT
Start: 2025-05-23

## 2025-05-26 RX ORDER — METFORMIN HYDROCHLORIDE 500 MG/1
TABLET, EXTENDED RELEASE ORAL
Qty: 270 TABLET | Refills: 0 | Status: SHIPPED | OUTPATIENT
Start: 2025-05-26

## 2025-05-26 RX ORDER — ESCITALOPRAM OXALATE 10 MG/1
10 TABLET ORAL DAILY
Qty: 90 TABLET | Refills: 0 | Status: SHIPPED | OUTPATIENT
Start: 2025-05-26

## 2025-05-26 NOTE — TELEPHONE ENCOUNTER
Please review; protocol failed/ has no protocol    Please see message below for upcoming appointment.    Future Appointments   Date Time Provider Department Center   6/30/2025  2:00 PM Shilpa Corona MD [facility]

## 2025-06-09 ENCOUNTER — PATIENT MESSAGE (OUTPATIENT)
Facility: CLINIC | Age: 25
End: 2025-06-09

## 2025-06-09 DIAGNOSIS — E66.01 CLASS 2 SEVERE OBESITY WITH SERIOUS COMORBIDITY AND BODY MASS INDEX (BMI) OF 35.0 TO 35.9 IN ADULT, UNSPECIFIED OBESITY TYPE (HCC): ICD-10-CM

## 2025-06-09 DIAGNOSIS — E28.2 PCOS (POLYCYSTIC OVARIAN SYNDROME): ICD-10-CM

## 2025-06-09 DIAGNOSIS — E66.812 CLASS 2 SEVERE OBESITY WITH SERIOUS COMORBIDITY AND BODY MASS INDEX (BMI) OF 35.0 TO 35.9 IN ADULT, UNSPECIFIED OBESITY TYPE (HCC): ICD-10-CM

## 2025-06-09 DIAGNOSIS — Z11.1 TUBERCULOSIS SCREENING: ICD-10-CM

## 2025-06-09 DIAGNOSIS — F41.9 ANXIETY: ICD-10-CM

## 2025-06-09 DIAGNOSIS — Z01.84 IMMUNITY STATUS TESTING: Primary | ICD-10-CM

## 2025-06-10 NOTE — TELEPHONE ENCOUNTER
Please see patient E-mail and advise.     Lab orders pended. Please review orders and diagnoses to make sure correct before signing.

## 2025-06-11 RX ORDER — ESCITALOPRAM OXALATE 10 MG/1
10 TABLET ORAL DAILY
Qty: 90 TABLET | Refills: 0 | OUTPATIENT
Start: 2025-06-11

## 2025-06-11 RX ORDER — METFORMIN HYDROCHLORIDE 500 MG/1
1000 TABLET, EXTENDED RELEASE ORAL DAILY
Qty: 180 TABLET | Refills: 0 | OUTPATIENT
Start: 2025-06-11

## 2025-06-11 RX ORDER — METFORMIN HYDROCHLORIDE 500 MG/1
TABLET, EXTENDED RELEASE ORAL
Qty: 270 TABLET | Refills: 0 | OUTPATIENT
Start: 2025-06-11

## 2025-06-11 NOTE — TELEPHONE ENCOUNTER
Escitalopram 10m day supply sent to [facility] in [place] on 2025    Metformin ER 500m day supply sent to [facility] on 2025

## 2025-06-18 ENCOUNTER — LAB ENCOUNTER (OUTPATIENT)
Dept: LAB | Age: 25
End: 2025-06-18
Attending: FAMILY MEDICINE
Payer: COMMERCIAL

## 2025-06-18 DIAGNOSIS — Z11.1 TUBERCULOSIS SCREENING: ICD-10-CM

## 2025-06-18 DIAGNOSIS — Z01.84 IMMUNITY STATUS TESTING: ICD-10-CM

## 2025-06-18 LAB
HBV SURFACE AB SER QL: REACTIVE
HBV SURFACE AB SERPL IA-ACNC: 12.39 MIU/ML

## 2025-06-18 PROCEDURE — 86706 HEP B SURFACE ANTIBODY: CPT | Performed by: FAMILY MEDICINE

## 2025-06-18 PROCEDURE — 86480 TB TEST CELL IMMUN MEASURE: CPT | Performed by: FAMILY MEDICINE

## 2025-06-20 LAB
M TB IFN-G CD4+ T-CELLS BLD-ACNC: 0.04 IU/ML
M TB TUBERC IFN-G BLD QL: NEGATIVE
M TB TUBERC IGNF/MITOGEN IGNF CONTROL: >10 IU/ML
QFT TB1 AG MINUS NIL: 0.01 IU/ML
QFT TB2 AG MINUS NIL: -0.01 IU/ML

## 2025-06-30 ENCOUNTER — OFFICE VISIT (OUTPATIENT)
Facility: CLINIC | Age: 25
End: 2025-06-30
Payer: COMMERCIAL

## 2025-06-30 VITALS
SYSTOLIC BLOOD PRESSURE: 120 MMHG | BODY MASS INDEX: 33.15 KG/M2 | DIASTOLIC BLOOD PRESSURE: 80 MMHG | OXYGEN SATURATION: 99 % | HEART RATE: 83 BPM | HEIGHT: 65 IN | WEIGHT: 199 LBS

## 2025-06-30 DIAGNOSIS — F41.9 ANXIETY: ICD-10-CM

## 2025-06-30 DIAGNOSIS — Z00.00 ENCOUNTER FOR GENERAL ADULT MEDICAL EXAMINATION W/O ABNORMAL FINDINGS: Primary | ICD-10-CM

## 2025-06-30 DIAGNOSIS — J45.990 MILD EXERCISE-INDUCED ASTHMA (HCC): ICD-10-CM

## 2025-06-30 DIAGNOSIS — N64.89 BREAST ASYMMETRY: ICD-10-CM

## 2025-06-30 DIAGNOSIS — R25.1 TREMOR: ICD-10-CM

## 2025-06-30 DIAGNOSIS — E66.01 CLASS 2 SEVERE OBESITY WITH SERIOUS COMORBIDITY AND BODY MASS INDEX (BMI) OF 35.0 TO 35.9 IN ADULT, UNSPECIFIED OBESITY TYPE (HCC): ICD-10-CM

## 2025-06-30 DIAGNOSIS — E66.812 CLASS 2 SEVERE OBESITY WITH SERIOUS COMORBIDITY AND BODY MASS INDEX (BMI) OF 35.0 TO 35.9 IN ADULT, UNSPECIFIED OBESITY TYPE (HCC): ICD-10-CM

## 2025-06-30 RX ORDER — ESCITALOPRAM OXALATE 10 MG/1
10 TABLET ORAL DAILY
Qty: 90 TABLET | Refills: 0 | Status: SHIPPED | OUTPATIENT
Start: 2025-06-30

## 2025-06-30 RX ORDER — PHENTERMINE HYDROCHLORIDE 15 MG/1
15 CAPSULE ORAL EVERY MORNING
Qty: 30 CAPSULE | Refills: 0 | Status: SHIPPED | OUTPATIENT
Start: 2025-06-30

## 2025-06-30 NOTE — PROGRESS NOTES
Subjective:   Michael Hoffman is a 24 year old female who presents for No chief complaint on file.     Patient is noting left breast is enlarging over the past 2-3 months. Patient does do breast exams. No pertinent family history. No discharge from breast. Does do own breast exams and no masses palpated.     Patient also needs refills of lexapro and phentermine.     Patient will also note shaking of arms and hands 2 hours after breakfast. Of note if eats more filling protein filled meal will be 3 hours until notes shaking. No known family history of benign essential tremor. Of note completely resolved with rybelsus.     Mild intermittent asthma  Stable doing well. 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       History/Other:    No Further Nursing Notes to Review  Tobacco Reviewed  Allergies   Reviewed  Medications Reviewed  Problem List Reviewed  Medical History   Reviewed  Surgical History Reviewed  OB Status Reviewed  Family History   Reviewed  Social History Reviewed         Tobacco:  She has never smoked tobacco.    Current Medications[1]      Review of Systems:  Review of Systems   Constitutional: Negative.    HENT: Negative.     Eyes: Negative.    Respiratory: Negative.     Cardiovascular: Negative.    Gastrointestinal: Negative.    Genitourinary: Negative.         +left breast enlarging   Musculoskeletal: Negative.    Skin: Negative.    Neurological:  Positive for tremors.   Psychiatric/Behavioral: Negative.           Objective:   /80   Pulse 83   Ht 5' 5\" (1.651 m)   Wt 199 lb (90.3 kg)   LMP 08/16/2024   SpO2 99%   BMI 33.12 kg/m²  Estimated body mass index is 33.12 kg/m² as calculated from the following:    Height as of this encounter: 5' 5\" (1.651 m).    Weight as of this encounter: 199 lb (90.3 kg).  Physical Exam  Vitals and nursing note reviewed.   Constitutional:       General: She is not in acute distress.     Appearance: Normal appearance. She  is not ill-appearing.   HENT:      Head: Normocephalic and atraumatic.      Right Ear: Tympanic membrane normal. There is no impacted cerumen.      Left Ear: Tympanic membrane normal. There is no impacted cerumen.      Mouth/Throat:      Mouth: Mucous membranes are moist.      Pharynx: Oropharynx is clear. No oropharyngeal exudate or posterior oropharyngeal erythema.   Eyes:      General:         Right eye: No discharge.         Left eye: No discharge.      Extraocular Movements: Extraocular movements intact.      Pupils: Pupils are equal, round, and reactive to light.   Cardiovascular:      Rate and Rhythm: Normal rate and regular rhythm.      Heart sounds: Normal heart sounds. No murmur heard.     No friction rub. No gallop.   Pulmonary:      Effort: Pulmonary effort is normal.      Breath sounds: Normal breath sounds. No wheezing, rhonchi or rales.   Chest:   Breasts:     Breasts are asymmetrical (left breast larger than right).      Right: Normal. No swelling, bleeding, inverted nipple, mass, nipple discharge, skin change or tenderness.      Left: Normal. No swelling, bleeding, inverted nipple, mass, nipple discharge, skin change or tenderness.   Abdominal:      General: Abdomen is flat. Bowel sounds are normal. There is no distension.      Palpations: Abdomen is soft. There is no mass.      Tenderness: There is no abdominal tenderness. There is no guarding or rebound.   Musculoskeletal:         General: Normal range of motion.      Cervical back: Normal range of motion.      Right lower leg: No edema.      Left lower leg: No edema.   Lymphadenopathy:      Upper Body:      Right upper body: No axillary adenopathy.      Left upper body: No axillary adenopathy.   Skin:     General: Skin is warm and dry.      Findings: No rash.   Neurological:      General: No focal deficit present.      Mental Status: She is alert. Mental status is at baseline.   Psychiatric:         Mood and Affect: Mood normal.         Behavior:  Behavior normal.       Offered patient chaperone for  exam. Patient declined chaperone      Assessment & Plan:   1. Encounter for general adult medical examination w/o abnormal findings (Primary)  -     CBC With Differential With Platelet; Future; Expected date: 06/30/2025  -     Iron And Tibc; Future; Expected date: 06/30/2025  -     Ferritin; Future; Expected date: 06/30/2025    Patient not fasting.  Ordered labs as above.  Up-to-date on vaccines  And Pap smear    2. Anxiety  Orders:  -     Escitalopram Oxalate; Take 1 tablet (10 mg total) by mouth daily.  Dispense: 90 tablet; Refill: 0    Stable doing well.  Lexapro refilled    3. Class 2 severe obesity with serious comorbidity and body mass index (BMI) of 35.0 to 35.9 in adult, unspecified obesity type (HCC)  -     Phentermine HCl; Take 1 capsule (15 mg total) by mouth every morning.  Dispense: 30 capsule; Refill: 0    [place]. No red flags. Refilled phentermine as written.     4. Mild exercise-induced asthma (HCC)  Controlled.  ACT score 23    5. Tremor  Counseled patient may consider endocrinology workup in [place]    6. Breast asymmetry  No masses noted on exam.  Do appreciate asymmetry.  Counseled patient since she is moving tomorrow to establish care in [place] for potential breast ultrasound.    Return if symptoms worsen or fail to improve.    Shilpa Corona MD, 6/30/2025, 2:03 PM          [1]   Current Outpatient Medications   Medication Sig Dispense Refill    escitalopram (LEXAPRO) 10 MG Oral Tab Take 1 tablet (10 mg total) by mouth daily. 90 tablet 0    Phentermine HCl 15 MG Oral Cap Take 1 capsule (15 mg total) by mouth every morning. 30 capsule 0    metFORMIN  MG Oral Tablet 24 Hr Take two tablets by mouth in the AM and then one table by mouth in  tablet 0    escitalopram 5 MG Oral Tab Take 1 tablet (5 mg total) by mouth daily. 90 tablet 3    EPINEPHrine 0.3 MG/0.3ML Injection Solution  Auto-injector Inject 0.3 mL (1 each total) into the muscle as needed. 2 each 1    Levonorgestrel (MIRENA, 52 MG,) 20 MCG/DAY Intrauterine IUD 20 mcg (1 each total) by Intrauterine route one time.      ondansetron 4 MG Oral Tablet Dispersible Take 1 tablet (4 mg total) by mouth every 8 (eight) hours as needed for Nausea. 30 tablet 0    albuterol 108 (90 Base) MCG/ACT Inhalation Aero Soln Inhale 2 puffs into the lungs as needed for Shortness of Breath.      cholecalciferol 25 MCG (1000 UT) Oral Cap Take 1 capsule (1,000 Units total) by mouth daily.      ascorbic acid 500 MG Oral Tab Take 1 tablet (500 mg total) by mouth every other day.      Budesonide-Formoterol Fumarate 80-4.5 MCG/ACT Inhalation Aerosol Inhale 2 puffs into the lungs as needed.      Ferrous Sulfate 325 (65 Fe) MG Oral Tab Take 1 tablet (325 mg total) by mouth daily.      levocetirizine 5 MG Oral Tab Take 1 tablet (5 mg total) by mouth every evening.

## (undated) DEVICE — HYSTEROSCOPY: Brand: MEDLINE INDUSTRIES, INC.

## (undated) DEVICE — GLOVE SUR 7 SENSICARE PI MIC PIP CRM PWD F

## (undated) DEVICE — CANISTER SUCT 3000CC HI FLO DISP FOR FLD MGMT

## (undated) DEVICE — SOFT TISSUE SHAVER MINI: Brand: TRUCLEAR

## (undated) DEVICE — KIT HYSTEROSCOPIC PROC INCL INFLO OUTFLO TB

## (undated) DEVICE — GLOVE SUR 6.5 SENSICARE PI MIC PIP CRM PWD F

## (undated) DEVICE — SOLUTION IRRIG 3000ML 0.9% NACL FLX CONT

## (undated) NOTE — MR AVS SNAPSHOT
After Visit Summary   2/14/2024    Michael Hoffman   MRN: [id number]           Visit Information     Date & Time  2/14/2024  4:15 PM Provider  Sierra Mackenzie MD [facility] - OB/GYN Dept. Phone  [phone]      Your Vitals Were  Most recent update: 2/14/2024  4:29 PM    BP   110/72    Ht   65\"    Wt   219 lb    LMP   09/25/2023    BMI   36.44 kg/m²         Allergies as of 2/14/2024  Review status set to Review Complete on 2/14/2024       Noted Reaction Type Reactions    Shellfish Allergy 03/25/2022    HIVES, ITCHING, PAIN    Shrimp 09/30/2022    HIVES      Your Current Medications        Dosage    azithromycin 500 MG Oral Tab 4 pills x 1.    Semaglutide (RYBELSUS) 14 MG Oral Tab Take 1 tablet by mouth daily.    albuterol 108 (90 Base) MCG/ACT Inhalation Aero Soln Inhale 2 puffs into the lungs as needed for Shortness of Breath.    cholecalciferol 25 MCG (1000 UT) Oral Cap Take 1 capsule (1,000 Units total) by mouth daily.    ascorbic acid 500 MG Oral Tab Take 1 tablet (500 mg total) by mouth every other day.    azelastine 0.1 % Nasal Solution 2 sprays by Nasal route as needed for Rhinitis.    Ferrous Sulfate 325 (65 Fe) MG Oral Tab Take 1 tablet (325 mg total) by mouth daily.    escitalopram (LEXAPRO) 10 MG Oral Tab Take 1 tablet (10 mg total) by mouth daily.    escitalopram 5 MG Oral Tab Take 1 tablet (5 mg total) by mouth daily.    metFORMIN  MG Oral Tablet 24 Hr Take 1 tablet (500 mg total) by mouth.    levocetirizine 5 MG Oral Tab Take 1 tablet (5 mg total) by mouth every evening.    norgestrel-ethinyl estradiol 0.3-30 MG-MCG Oral Tab Take 1 tablet by mouth daily. Do not take placebos    ondansetron 4 MG Oral Tablet Dispersible Take 1 tablet (4 mg total) by mouth every 8 (eight) hours as needed for Nausea.    Budesonide-Formoterol Fumarate 80-4.5 MCG/ACT Inhalation Aerosol Inhale 2 puffs into the lungs as needed.    EPINEPHrine  0.3 MG/0.3ML Injection Solution Auto-injector Inject 0.3 mL (1 each total) into the muscle as needed.      Diagnoses for This Visit    Metrorrhagia   [626.6.ICD-9-CM]  -  Primary           We Ordered the Following     Normal Orders This Visit    Chlamydia/Gc Amplification [[id number] CUSTOM]     ThinPrep PAP with HPV Reflex Request [[id number] CUSTOM]     ThinPrep PAP with HPV Reflex Request [[id number] CUSTOM]     Vaginitis Vaginosis PCR Panel [[id number] CUSTOM]     Future Labs/Procedures Expected by Expires    Chlamydia/Gc Amplification [[id number] CUSTOM]  2/14/2024 (Approximate) 2/14/2025    ThinPrep PAP with HPV Reflex Request [[id number] CUSTOM]  2/14/2024 2/14/2025    Transvaginal US [facility] Only [12724] [[id number] CPT(R)]  2/14/2024 (Approximate) 2/13/2025    Vaginitis Vaginosis PCR Panel [[id number] CUSTOM]  2/14/2024 (Approximate) 2/14/2025      Future Appointments        Provider Department    2/19/2024 2:00 PM [facility] - OB/GYN    5/20/2024 9:00 AM Shilpa Corona [facility]      Imaging Scheduling Instructions     Around February 14, 2024   Imaging:   Transvaginal US [facility] Only [[id number]]                    Did you know [place] [facility] [place] now offer Video Visits through [employer] for adult patients for a variety of conditions such as allergies, back pain and cold symptoms? Skip the drive and waiting room and online chat with a doctor face-to-face using your web-cam enabled computer or mobile device wherever you are. Video Visits cost $50 and can be paid hassle-free using a credit, debit, or health savings card.  Not active on [employer]? Ask us how to get signed up today!          If you receive a survey from [employer], please take a few minutes to complete it and provide feedback. We strive to deliver the best patient experience and are looking for ways to make improvements. Your feedback will help us do so. For more  information on [employer], please visit [email or web address]           No text in SmartText           No text in SmartText

## (undated) NOTE — LETTER
ASTHMA ACTION PLAN for Michael Hoffman     : 2000     Date: 24  Doctor:  Shilpa Corona MD  Phone for doctor or clinic: formerly [facility], [address] [address] [phone]      ACT Score: 25    ACT Goal: 20 or greater    Call your provider if you require your rescue/quick reliever medication more than 2-3 times in a 24 hour period.    If you require your rescue inhaler/medication more than 2-3 times weekly, your asthma may not be under proper control and you should seek medical attention.    *Quick Relievers are Xopenex and Albuterol*    You can use the colors of a traffic light to help learn about your asthma medicines.  Year Round       1. Green - Go! % of Personal Best Peak Flow   Use controller medicine.   Breathing is good  No cough or wheeze  Can work and play Medicine How much to take When to take it    Medications       Sympathomimetics Instructions     albuterol 108 (90 Base) MCG/ACT Inhalation Aero Soln Inhale 2 puffs into the lungs as needed for Shortness of Breath.     Budesonide-Formoterol Fumarate 80-4.5 MCG/ACT Inhalation Aerosol Inhale 2 puffs into the lungs as needed.                    2. Yellow - Caution. 50-79% Personal Best Peak Flow  Use reliever medicine to keep an asthma attack from getting bad.   Cough  Quick Relievers  Wheezing  Tight Chest  Wake up at night Medicine How much to take When to take it    If symptoms are not improving in 24-48 hrs, call office for further instructions  Medications       Sympathomimetics Instructions     albuterol 108 (90 Base) MCG/ACT Inhalation Aero Soln Inhale 2 puffs into the lungs as needed for Shortness of Breath.     Budesonide-Formoterol Fumarate 80-4.5 MCG/ACT Inhalation Aerosol Inhale 2 puffs into the lungs as needed.                    3. Red - Stop! Danger! <50% Personal Best Peak Flow  Continue Controller Medications But ADD:   Medicine not helping  Breathing is  hard and fast  Nose opens wide  Can't walk  Ribs show  Can't talk well Medicine How much to take When to take it    If your symptoms do not improve in ONE hour -  go to the emergency room or call 911 immediately! If symptoms improve, call office for appointment immediately.    Albuterol inhaler 2 puffs every 20 minutes for three treatments       Don't forget:  Rinse mouth after using inhaler  Use spacer for inhaler  Remember to get your Flu vaccine every fall!    [x] Asthma Action Plan reviewed with the caregiver and patient, and a copy of the plan was given to the patient/caregiver.   [] Asthma Action Plan reviewed with the caregiver and patient on the phone, and copy mailed to patient/caregiver or sent via [employer].     Signatures:   Provider  Shilpa Corona MD Patient  Michael Hoffman Caretaker

## (undated) NOTE — LETTER
Michael Hoffman, :2000    CONSENT FOR PROCEDURE/SEDATION    1. I authorize the performance upon Michael Hoffman  the following:  sonohysterogram     2. I authorize Dr. Sierra Mackenzie MD (and whomever is designated as the doctor’s assistant), to perform the above-mentioned procedures.    3. If any unforeseen conditions arise during this procedure calling for additional  procedures, operations, or medications (including anesthesia and blood transfusion), I further request and authorize the doctor to do whatever he/she deems advisable in my interest.    4. I consent to the taking and reproduction of any photographs in the course of this procedure for professional purposes.    5. I consent to the administration of such sedation as may be considered necessary or advisable by the physician responsible for this service, with the exception of ______________________________________________________    6. I have been informed by my doctor of the nature and purpose of this procedure sedation, possible alternative methods of treatment, risk involved and possible complications.    7. If I have a Do Not Resuscitate (DNR) order in place, the physician and I (or the individual authorized to consent on my behalf) will discuss and agree as to whether the Do Not Resuscitate (DNR) order will remain in effect or will be discontinued during the performance of the procedure and the applicable recovery period. If the Do Not Resuscitate (DNR) order is discontinued and is to be reinstated following the procedure/recovery period, the physician will determine when the applicable recovery period ends for purposes of reinstating the Do Not Resuscitate (DNR) order.    Signature of Patient:_______________________________________________    Signature of person authorized to consent for patient:  _______________________________________________________________    Relationship to patient:  ____________________________________________    Witness: _________________________________________ Date:___________     Physician Signature: _______________________________ Date:___________

## (undated) NOTE — LETTER
AUTHORIZATION FOR SURGICAL OPERATION OR OTHER PROCEDURE    1. I hereby authorize Dr. Edmond Tucker, and [facility] staff assigned to my case to perform the following operation and/or procedure at the [facility] site:    _______________________________________________________________________________________________    Matrixectomy Bilateral Hallux  _______________________________________________________________________________________________    2.  My physician has explained the nature and purpose of the operation or other procedure, possible alternative methods of treatment, the risks involved, and the possibility of complication to me.  I acknowledge that no guarantee has been made as to the result that may be obtained.  3.  I recognize that, during the course of this operation, or other procedure, unforseen conditions may necessitate additional or different procedure than those listed above.  I, therefore, further authorize and request that the above named physician, his/her physician assistants or designees perform such procedures as are, in his/her professional opinion, necessary and desirable.  4.  Any tissue or organs removed in the operation or other procedure may be disposed of by and at the discretion of the [facility] and [facility].  5.  I understand that in the event of a medical emergency, I will be transported by local paramedics to [facility] or other hospital emergency department.  6.  I certify that I have read and fully understand the above consent to operation and/or other procedure.    7.  I acknowledge that my physician has explained sedation/analgesia administration to me including the risks and benefits.  I consent to the administration of sedation/analgesia as may be necessary or desirable in the judgement of my physician.    Witness signature: ___________________________________________________ Date:   ______/______/_____                    Time:  ________ A.M.  P.M.       Patient Name:  ______________________________________________________  (please print)      Patient signature:  ___________________________________________________             Relationship to Patient:           []  Parent    Responsible person                          []  Spouse  In case of minor or                    [] Other  _____________   Incompetent name:  __________________________________________________                               (please print)      _____________      Responsible person  In case of minor or  Incompetent signature:  _______________________________________________    Statement of Physician  My signature below affirms that prior to the time of the procedure, I have explained to the patient and/or his/her guardian, the risks and benefits involved in the proposed treatment and any reasonable alternative to the proposed treatment.  I have also explained the risks and benefits involved in the refusal of the proposed treatment and have answered the patient's questions.                        Date:  ______/______/_______  Provider                      Signature:  __________________________________________________________       Time:  ___________ A.M    P.M.